# Patient Record
Sex: FEMALE | Race: WHITE | Employment: OTHER | URBAN - METROPOLITAN AREA
[De-identification: names, ages, dates, MRNs, and addresses within clinical notes are randomized per-mention and may not be internally consistent; named-entity substitution may affect disease eponyms.]

---

## 2015-08-12 LAB
CREATININE, EXTERNAL: 0.78
HBA1C MFR BLD HPLC: 5.5 %
LDL-C, EXTERNAL: 87

## 2017-01-26 ENCOUNTER — OFFICE VISIT (OUTPATIENT)
Dept: FAMILY MEDICINE CLINIC | Age: 62
End: 2017-01-26

## 2017-01-26 VITALS
RESPIRATION RATE: 18 BRPM | OXYGEN SATURATION: 95 % | BODY MASS INDEX: 32.58 KG/M2 | WEIGHT: 215 LBS | HEIGHT: 68 IN | DIASTOLIC BLOOD PRESSURE: 77 MMHG | SYSTOLIC BLOOD PRESSURE: 126 MMHG | HEART RATE: 61 BPM | TEMPERATURE: 97.9 F

## 2017-01-26 DIAGNOSIS — F41.9 ANXIETY: ICD-10-CM

## 2017-01-26 DIAGNOSIS — I10 ESSENTIAL HYPERTENSION: ICD-10-CM

## 2017-01-26 DIAGNOSIS — E03.9 HYPOTHYROIDISM, UNSPECIFIED TYPE: ICD-10-CM

## 2017-01-26 DIAGNOSIS — M54.5 CHRONIC LOW BACK PAIN, UNSPECIFIED BACK PAIN LATERALITY, WITH SCIATICA PRESENCE UNSPECIFIED: Primary | ICD-10-CM

## 2017-01-26 DIAGNOSIS — M62.838 MUSCLE SPASM: ICD-10-CM

## 2017-01-26 DIAGNOSIS — G62.9 NEUROPATHY: ICD-10-CM

## 2017-01-26 DIAGNOSIS — G89.29 CHRONIC LOW BACK PAIN, UNSPECIFIED BACK PAIN LATERALITY, WITH SCIATICA PRESENCE UNSPECIFIED: Primary | ICD-10-CM

## 2017-01-26 PROBLEM — M54.50 CHRONIC LOW BACK PAIN: Status: ACTIVE | Noted: 2017-01-26

## 2017-01-26 RX ORDER — CYCLOBENZAPRINE HCL 10 MG
TABLET ORAL
COMMUNITY
End: 2018-01-31

## 2017-01-26 RX ORDER — OLMESARTAN MEDOXOMIL 20 MG/1
20 TABLET ORAL DAILY
COMMUNITY
End: 2017-02-28 | Stop reason: SDUPTHER

## 2017-01-26 RX ORDER — GABAPENTIN 100 MG/1
200 CAPSULE ORAL 3 TIMES DAILY
Qty: 540 CAP | Refills: 1 | Status: SHIPPED | OUTPATIENT
Start: 2017-01-26 | End: 2017-06-05 | Stop reason: SDUPTHER

## 2017-01-26 RX ORDER — METHOCARBAMOL 750 MG/1
750 TABLET, FILM COATED ORAL 3 TIMES DAILY
Qty: 270 TAB | Refills: 1 | Status: SHIPPED | OUTPATIENT
Start: 2017-01-26 | End: 2017-06-05 | Stop reason: SDUPTHER

## 2017-01-26 RX ORDER — GLUCOSAMINE SULFATE 1500 MG
POWDER IN PACKET (EA) ORAL DAILY
COMMUNITY

## 2017-01-26 RX ORDER — DIAZEPAM 5 MG/1
5 TABLET ORAL
COMMUNITY
End: 2017-02-28 | Stop reason: SDUPTHER

## 2017-01-26 RX ORDER — VENLAFAXINE HYDROCHLORIDE 75 MG/1
75 CAPSULE, EXTENDED RELEASE ORAL DAILY
Qty: 90 CAP | Refills: 1 | Status: SHIPPED | OUTPATIENT
Start: 2017-01-26 | End: 2017-04-24 | Stop reason: SDUPTHER

## 2017-01-26 RX ORDER — LEVOTHYROXINE SODIUM 50 UG/1
TABLET ORAL
COMMUNITY

## 2017-01-26 RX ORDER — METHOCARBAMOL 750 MG/1
TABLET, FILM COATED ORAL 4 TIMES DAILY
COMMUNITY
End: 2017-01-26 | Stop reason: SDUPTHER

## 2017-01-26 RX ORDER — AZELASTINE HYDROCHLORIDE, FLUTICASONE PROPIONATE 137; 50 UG/1; UG/1
SPRAY, METERED NASAL
COMMUNITY
End: 2018-03-01

## 2017-01-26 RX ORDER — GLUCOSAM/CHONDRO/HERB 149/HYAL 750-100 MG
TABLET ORAL
COMMUNITY

## 2017-01-26 RX ORDER — GABAPENTIN 100 MG/1
200 CAPSULE ORAL 3 TIMES DAILY
COMMUNITY
End: 2017-01-26 | Stop reason: SDUPTHER

## 2017-01-26 RX ORDER — VENLAFAXINE HYDROCHLORIDE 75 MG/1
CAPSULE, EXTENDED RELEASE ORAL DAILY
COMMUNITY
End: 2017-01-26 | Stop reason: SDUPTHER

## 2017-01-26 NOTE — MR AVS SNAPSHOT
Visit Information Date & Time Provider Department Dept. Phone Encounter #  
 1/26/2017  2:15 PM Jenaro Bird. [address] Am[address] Bernardino Aleman [phone] [id number] Follow-up Instructions Return in about 3 weeks (around 2/16/2017), or if symptoms worsen or fail to improve, for chronic pain, anxiety, . Upcoming Health Maintenance Date Due Hepatitis C Screening 1955 PAP AKA CERVICAL CYTOLOGY 11/6/1977 BREAST CANCER SCRN MAMMOGRAM 11/6/2006 FOBT Q 1 YEAR AGE 50-75 11/6/2006 ZOSTER VACCINE AGE 60> 11/6/2016 DTaP/Tdap/Td series (2 - Td) 12/14/2020 Allergies as of 1/26/2017  Review Complete On: 1/26/2017 By: Maria Fernanda Sandoval LPN Severity Noted Reaction Type Reactions Sulfa (Sulfonamide Antibiotics)  01/26/2017    Rash Current Immunizations  Never Reviewed No immunizations on file. Not reviewed this visit You Were Diagnosed With   
  
 Codes Comments Chronic low back pain, unspecified back pain laterality, with sciatica presence unspecified    -  Primary ICD-10-CM: M54.5, G89.29 ICD-9-CM: 724.2, 338.29 Anxiety     ICD-10-CM: F41.9 ICD-9-CM: 300.00 Muscle spasm     ICD-10-CM: T48.646 ICD-9-CM: 728.85 Essential hypertension     ICD-10-CM: I10 
ICD-9-CM: 401.9 Neuropathy     ICD-10-CM: G62.9 ICD-9-CM: 355.9 Hypothyroidism, unspecified type     ICD-10-CM: E03.9 ICD-9-CM: 557. 9 Vitals BP Pulse Temp Resp Height(growth percentile) Weight(growth percentile) 126/77 (BP 1 Location: Left arm, BP Patient Position: Sitting) 61 97.9 °F (36.6 °C) (Oral) 18 5' 8\" (1.727 m) 215 lb (97.5 kg) SpO2 BMI OB Status Smoking Status 95% 32.69 kg/m2 Hysterectomy Never Smoker BMI and BSA Data Body Mass Index Body Surface Area  
 32.69 kg/m 2 2.16 m 2 Preferred Pharmacy Pharmacy Name Phone [address] [place], [place], . [address] [phone] Your Updated Medication List  
  
   
This list is accurate as of: 1/26/17  3:14 PM.  Always use your most recent med list.  
  
  
  
  
 BENICAR 20 mg tablet Generic drug:  olmesartan Take 20 mg by mouth daily. cyclobenzaprine 10 mg tablet Commonly known as:  FLEXERIL Take  by mouth three (3) times daily as needed for Muscle Spasm(s). diazePAM 5 mg tablet Commonly known as:  VALIUM Take 5 mg by mouth every six (6) hours as needed for Anxiety. DYMISTA 137-50 mcg/spray [place] Generic drug:  azelastine-fluticasone  
by Nasal route. EVAMIST 1.53 mg/spray (1.7%) [place] Generic drug:  Estradiol  
by TransDERmal route.  
  
 gabapentin 100 mg capsule Commonly known as:  NEURONTIN Take 2 Caps by mouth three (3) times daily. HYDROcodone-acetaminophen 7.5-500 mg per tablet Commonly known as:  Anjali Lang Take  by mouth every four (4) hours as needed for Pain.  
  
 levothyroxine 50 mcg tablet Commonly known as:  SYNTHROID Take  by mouth Daily (before breakfast). methocarbamol 750 mg tablet Commonly known as:  ROBAXIN Take 1 Tab by mouth three (3) times daily. Omega-3-DHA-EPA-Fish Oil 1,000 mg (120 mg-180 mg) Cap Take  by mouth. venlafaxine-SR 75 mg capsule Commonly known as:  EFFEXOR XR Take 1 Cap by mouth daily. VITAMIN D3 1,000 unit Cap Generic drug:  cholecalciferol Take  by mouth daily. WOMEN'S DAILY MULTIVITAMIN PO Take  by mouth. Prescriptions Sent to Pharmacy Refills  
 gabapentin (NEURONTIN) 100 mg capsule 1 Sig: Take 2 Caps by mouth three (3) times daily. Class: Normal  
 Pharmacy: [place], [place] - [address] Ph #: [phone] Route: Oral  
 venlafaxine-SR (EFFEXOR XR) 75 mg capsule 1 Sig: Take 1 Cap by mouth daily. Class: Normal  
 Pharmacy: [place], [place] - [address] Ph #: [phone]  Route: Oral  
 methocarbamol (ROBAXIN) 750 mg tablet 1 Sig: Take 1 Tab by mouth three (3) times daily. Class: Normal  
 Pharmacy: [place], [place] - [address] Ph #: [phone] Route: Oral  
  
We Performed the Following REFERRAL TO PAIN CLINIC [VTU93 Custom] Comments:  
 Please evaluate patient for chronic back pain. Follow-up Instructions Return in about 3 weeks (around 2/16/2017), or if symptoms worsen or fail to improve, for chronic pain, anxiety, . Referral Information Referral ID Referred By Referred To  
  
 [id number] Status Start Date End Date 1 New Request 1/26/17 1/26/18 If your referral has a status of pending review or denied, additional information will be sent to support the outcome of this decision. Patient Instructions Anxiety Disorder: Care Instructions Your Care Instructions Anxiety is a normal reaction to stress. Difficult situations can cause you to have symptoms such as sweaty palms and a nervous feeling. In an anxiety disorder, the symptoms are far more severe. Constant worry, muscle tension, trouble sleeping, nausea and diarrhea, and other symptoms can make normal daily activities difficult or impossible. These symptoms may occur for no reason, and they can affect your work, school, or social life. Medicines, counseling, and self-care can all help. Follow-up care is a key part of your treatment and safety. Be sure to make and go to all appointments, and call your doctor if you are having problems. It's also a good idea to know your test results and keep a list of the medicines you take. How can you care for yourself at home? · Take medicines exactly as directed. Call your doctor if you think you are having a problem with your medicine. · Go to your counseling sessions and follow-up appointments. · Recognize and accept your anxiety.  Then, when you are in a situation that makes you anxious, say to yourself, \"This is not an emergency. I feel uncomfortable, but I am not in danger. I can keep going even if I feel anxious. \" · Be kind to your body: ¨ Relieve tension with exercise or a massage. ¨ Get enough rest. 
¨ Avoid alcohol, caffeine, nicotine, and illegal drugs. They can increase your anxiety level and cause sleep problems. ¨ Learn and do relaxation techniques. See below for more about these techniques. · Engage your mind. Get out and do something you enjoy. Go to a [employer] movie, or take a walk or hike. Plan your day. Having too much or too little to do can make you anxious. · Keep a record of your symptoms. Discuss your fears with a good friend or family member, or join a support group for people with similar problems. Talking to others sometimes relieves stress. · Get involved in social groups, or volunteer to help others. Being alone sometimes makes things seem worse than they are. · Get at least 30 minutes of exercise on most days of the week to relieve stress. Walking is a good choice. You also may want to do other activities, such as running, swimming, cycling, or playing tennis or team sports. Relaxation techniques Do relaxation exercises 10 to 20 minutes a day. You can play soothing, relaxing music while you do them, if you wish. · Tell others in your house that you are going to do your relaxation exercises. Ask them not to disturb you. · Find a comfortable place, away from all distractions and noise. · Lie down on your back, or sit with your back straight. · Focus on your breathing. Make it slow and steady. · Breathe in through your nose. Breathe out through either your nose or mouth. · Breathe deeply, filling up the area between your navel and your rib cage. Breathe so that your belly goes up and down. · Do not hold your breath. · Breathe like this for 5 to 10 minutes. Notice the feeling of calmness throughout your whole body. As you continue to breathe slowly and deeply, relax by doing the following for another 5 to 10 minutes: · Tighten and relax each muscle group in your body. You can begin at your toes and work your way up to your head. · Imagine your muscle groups relaxing and becoming heavy. · Empty your mind of all thoughts. · Let yourself relax more and more deeply. · Become aware of the state of calmness that surrounds you. · When your relaxation time is over, you can bring yourself back to alertness by moving your fingers and toes and then your hands and feet and then stretching and moving your entire body. Sometimes people fall asleep during relaxation, but they usually wake up shortly afterward. · Always give yourself time to return to full alertness before you drive a car or do anything that might cause an accident if you are not fully alert. Never play a relaxation tape while you drive a car. When should you call for help? Call 911 anytime you think you may need emergency care. For example, call if: 
· You feel you cannot stop from hurting yourself or someone else. Keep the numbers for these national suicide hotlines: 0-634-445-TALK (8-483.842.7456) and 9-889-YIOFRTC (5-449.746.2346). If you or someone you know talks about suicide or feeling hopeless, get help right away. Watch closely for changes in your health, and be sure to contact your doctor if: 
· You have anxiety or fear that affects your life. · You have symptoms of anxiety that are new or different from those you had before. Where can you learn more? Go to http://dustin-lilia.info/. Enter P754 in the search box to learn more about \"Anxiety Disorder: Care Instructions. \" Current as of: July 26, 2016 Content Version: 11.1 © 1440-4032 [employer].  Care instructions adapted under license by [employer] (which disclaims liability or warranty for this information). If you have questions about a medical condition or this instruction, always ask your healthcare professional. [address] any warranty or liability for your use of this information. Back Pain: Care Instructions Your Care Instructions Back pain has many possible causes. It is often related to problems with muscles and ligaments of the back. It may also be related to problems with the nerves, discs, or bones of the back. Moving, lifting, standing, sitting, or sleeping in an awkward way can strain the back. Sometimes you don't notice the injury until later. Arthritis is another common cause of back pain. Although it may hurt a lot, back pain usually improves on its own within several weeks. Most people recover in 12 weeks or less. Using good home treatment and being careful not to stress your back can help you feel better sooner. Follow-up care is a key part of your treatment and safety. Be sure to make and go to all appointments, and call your doctor if you are having problems. Its also a good idea to know your test results and keep a list of the medicines you take. How can you care for yourself at home? · Sit or lie in positions that are most comfortable and reduce your pain. Try one of these positions when you lie down: ¨ Lie on your back with your knees bent and supported by large pillows. ¨ Lie on the floor with your legs on the seat of a sofa or chair. Sepideh Running on your side with your knees and hips bent and a pillow between your legs. ¨ Lie on your stomach if it does not make pain worse. · Do not sit up in bed, and avoid soft couches and twisted positions. Bed rest can help relieve pain at first, but it delays healing. Avoid bed rest after the first day of back pain. · Change positions every 30 minutes. If you must sit for long periods of time, take breaks from sitting. Get up and walk around, or lie in a comfortable position. · Try using a heating pad on a low or medium setting for 15 to 20 minutes every 2 or 3 hours. Try a warm shower in place of one session with the heating pad. · You can also try an ice pack for 10 to 15 minutes every 2 to 3 hours. Put a thin cloth between the ice pack and your skin. · Take pain medicines exactly as directed. ¨ If the doctor gave you a prescription medicine for pain, take it as prescribed. ¨ If you are not taking a prescription pain medicine, ask your doctor if you can take an over-the-counter medicine. · Take short walks several times a day. You can start with 5 to 10 minutes, 3 or 4 times a day, and work up to longer walks. Walk on level surfaces and avoid hills and stairs until your back is better. · Return to work and other activities as soon as you can. Continued rest without activity is usually not good for your back. · To prevent future back pain, do exercises to stretch and strengthen your back and stomach. Learn how to use good posture, safe lifting techniques, and proper body mechanics. When should you call for help? Call your doctor now or seek immediate medical care if: 
· You have new or worsening numbness in your legs. · You have new or worsening weakness in your legs. (This could make it hard to stand up.) · You lose control of your bladder or bowels. Watch closely for changes in your health, and be sure to contact your doctor if: 
· Your pain gets worse. · You are not getting better after 2 weeks. Where can you learn more? Go to http://dustin-lilia.info/. Enter T596 in the search box to learn more about \"Back Pain: Care Instructions. \" Current as of: May 23, 2016 Content Version: 11.1 © 8931-6060 [employer]. Care instructions adapted under license by [employer] (which disclaims liability or warranty for this information).  If you have questions about a medical condition or this instruction, always ask your healthcare professional. [address]  any warranty or liability for your use of this information. Introducing [facility]! [facility] introduces Chlorine Genie patient portal. Now you can access parts of your medical record, email your doctor's office, and request medication refills online. 1. In your internet browser, go to [email or web address] 2. Click on the First Time User? Click Here link in the Sign In box. You will see the New Member Sign Up page. 3. Enter your Chlorine Genie Access Code exactly as it appears below. You will not need to use this code after youve completed the sign-up process. If you do not sign up before the expiration date, you must request a new code. · Chlorine Genie Access Code: [email or web address] Expires: 4/26/2017  1:35 PM 
 
4. Enter the last four digits of your Social Security Number (xxxx) and Date of Birth (mm/dd/yyyy) as indicated and click Submit. You will be taken to the next sign-up page. 5. Create a Chlorine Genie ID. This will be your Chlorine Genie login ID and cannot be changed, so think of one that is secure and easy to remember. 6. Create a Chlorine Genie password. You can change your password at any time. 7. Enter your Password Reset Question and Answer. This can be used at a later time if you forget your password. 8. Enter your e-mail address. You will receive e-mail notification when new information is available in [address]. 9. Click Sign Up. You can now view and download portions of your medical record. 10. Click the Download Summary menu link to download a portable copy of your medical information. If you have questions, please visit the Frequently Asked Questions section of the Chlorine Genie website. Remember, Chlorine Genie is NOT to be used for urgent needs. For medical emergencies, dial 911. Now available from your iPhone and Android! Please provide this summary of care documentation to your next provider. Your primary care clinician is listed as Berta Rivera. If you have any questions after today's visit, please call [phone].

## 2017-01-26 NOTE — PROGRESS NOTES
1. Have you been to the ER, urgent care clinic since your last visit? Hospitalized since your last visit? No    2. Have you seen or consulted any other health care providers outside of the [facility] since your last visit? Include any pap smears or colon screening. No     HPI  Verona Murillo comes in to establish care. 1) Anxiety and depression: patient has been managed for anxiety and depression, now out of medication for weeks. She is having shaking of her hands. Requests refill of medication. I will send in script for effexor. 2) Chronic pain: she has chronic back pain. She takes hydrocodone for this. She has been f/u in pain clinic for pain management. She is not interested in surgical management. Also does not want spine injections. I will place referral to pain management. I will also request records. 3) Muscle spasm: she has chronic muscle spasms. Takes robaxin. Requests refill of the medication. I will send in script for this. 4) Neuropathy: patient is on gabapentin. She is out of the medication. She has generalized neuropathic pain. I will send in script for the medication. 5) HTN: On olmesartan, stable. 6) Hypothyroidism: on levothyroxine. Past Medical History  Past Medical History   Diagnosis Date    Headache     Hypertension     Scoliosis     Thyroid disease     Vertigo        Surgical History  Past Surgical History   Procedure Laterality Date    Hx colonoscopy  08/2014    Hx hysterectomy  03/17/2003    Hx breast augmentation  11/28/2006    Hx gastric bypass  10/25/2001    Hx cholecystectomy          Medications  Current Outpatient Prescriptions   Medication Sig Dispense Refill    olmesartan (BENICAR) 20 mg tablet Take 20 mg by mouth daily.  levothyroxine (SYNTHROID) 50 mcg tablet Take  by mouth Daily (before breakfast).  cholecalciferol (VITAMIN D3) 1,000 unit cap Take  by mouth daily.       Omega-3-DHA-EPA-Fish Oil 1,000 mg (120 mg-180 mg) cap Take  by mouth.      MULTIVIT WITH CALCIUM,IRON,MIN (WOMEN'S DAILY MULTIVITAMIN PO) Take  by mouth.  Estradiol (EVAMIST) 1.53 mg/spray (1.7%) spry by TransDERmal route.  HYDROcodone-acetaminophen (LORTAB) 7.5-500 mg per tablet Take  by mouth every four (4) hours as needed for Pain.  diazePAM (VALIUM) 5 mg tablet Take 5 mg by mouth every six (6) hours as needed for Anxiety.  azelastine-fluticasone (DYMISTA) 137-50 mcg/spray spry by Nasal route.  cyclobenzaprine (FLEXERIL) 10 mg tablet Take  by mouth three (3) times daily as needed for Muscle Spasm(s).  gabapentin (NEURONTIN) 100 mg capsule Take 2 Caps by mouth three (3) times daily. 540 Cap 1    venlafaxine-SR (EFFEXOR XR) 75 mg capsule Take 1 Cap by mouth daily. 90 Cap 1    methocarbamol (ROBAXIN) 750 mg tablet Take 1 Tab by mouth three (3) times daily. 270 Tab 1       Allergies  Allergies   Allergen Reactions    Sulfa (Sulfonamide Antibiotics) Rash       Family History  Family History   Problem Relation Age of Onset    Elevated Lipids Mother     Hypertension Brother        Social History  Social History     Social History    Marital status:      Spouse name: N/A    Number of children: N/A    Years of education: N/A     Occupational History    Not on file.      Social History Main Topics    Smoking status: Never Smoker    Smokeless tobacco: Never Used    Alcohol use 1.8 oz/week     3 Glasses of wine per week    Drug use: No    Sexual activity: Yes     Partners: Male     Birth control/ protection: None     Other Topics Concern    Not on file     Social History Narrative    No narrative on file       Review of Systems  Review of Systems - History obtained from chart review and the patient  General ROS: positive for  - fatigue and malaise  negative for - chills or fever  Psychological ROS: positive for - anxiety, behavioral disorder, depression and mood swings  Ophthalmic ROS: negative  ENT ROS: negative  Endocrine ROS: hypothyroidism  Respiratory ROS: no cough, shortness of breath, or wheezing  Cardiovascular ROS: no chest pain or dyspnea on exertion  Gastrointestinal ROS: no abdominal pain, change in bowel habits, or black or bloody stools  Genito-Urinary ROS: no dysuria, trouble voiding, or hematuria  Musculoskeletal ROS: positive for - muscle pain and pain in back - lower and neck - both sides  Neurological ROS: positive for - numbness/tingling    Vital Signs  Visit Vitals    /77 (BP 1 Location: Left arm, BP Patient Position: Sitting)    Pulse 61    Temp 97.9 °F (36.6 °C) (Oral)    Resp 18    Ht 5' 8\" (1.727 m)    Wt 215 lb (97.5 kg)    SpO2 95%    BMI 32.69 kg/m2         Physical Exam  Physical Examination: General appearance - oriented to person, place, and time, acyanotic, in no respiratory distress and well hydrated  Mental status - alert, oriented to person, place, and time  Mouth - mucous membranes moist, pharynx normal without lesions  Chest - clear to auscultation, no wheezes, rales or rhonchi, symmetric air entry, no tachypnea, retractions or cyanosis  Heart - normal rate, regular rhythm, normal S1, S2, no murmurs, rubs, clicks or gallops  Back exam - limited range of motion, pain with motion noted during exam  Neurological - alert, oriented, normal speech, no focal findings or movement disorder noted  Musculoskeletal - osteoarthritic changes noted in both hands  Extremities - intact peripheral pulses    Diagnostics  Orders Placed This Encounter    REFERRAL TO PAIN CLINIC     Referral Priority:   Routine     Referral Type:   Consultation     Referral Reason:   Specialty Services Required    olmesartan (BENICAR) 20 mg tablet     Sig: Take 20 mg by mouth daily.  levothyroxine (SYNTHROID) 50 mcg tablet     Sig: Take  by mouth Daily (before breakfast).  cholecalciferol (VITAMIN D3) 1,000 unit cap     Sig: Take  by mouth daily.     DISCONTD: gabapentin (NEURONTIN) 100 mg capsule     Sig: Take 200 mg by mouth three (3) times daily.  Omega-3-DHA-EPA-Fish Oil 1,000 mg (120 mg-180 mg) cap     Sig: Take  by mouth.  MULTIVIT WITH CALCIUM,IRON,MIN (WOMEN'S DAILY MULTIVITAMIN PO)     Sig: Take  by mouth.  DISCONTD: venlafaxine-SR (EFFEXOR XR) 75 mg capsule     Sig: Take  by mouth daily.  Estradiol (EVAMIST) 1.53 mg/spray (1.7%) spry     Sig: by TransDERmal route.  HYDROcodone-acetaminophen (LORTAB) 7.5-500 mg per tablet     Sig: Take  by mouth every four (4) hours as needed for Pain.  diazePAM (VALIUM) 5 mg tablet     Sig: Take 5 mg by mouth every six (6) hours as needed for Anxiety.  DISCONTD: methocarbamol (ROBAXIN) 750 mg tablet     Sig: Take  by mouth four (4) times daily.  azelastine-fluticasone (DYMISTA) 137-50 mcg/spray spry     Sig: by Nasal route.  cyclobenzaprine (FLEXERIL) 10 mg tablet     Sig: Take  by mouth three (3) times daily as needed for Muscle Spasm(s).  gabapentin (NEURONTIN) 100 mg capsule     Sig: Take 2 Caps by mouth three (3) times daily. Dispense:  540 Cap     Refill:  1    venlafaxine-SR (EFFEXOR XR) 75 mg capsule     Sig: Take 1 Cap by mouth daily. Dispense:  90 Cap     Refill:  1    methocarbamol (ROBAXIN) 750 mg tablet     Sig: Take 1 Tab by mouth three (3) times daily. Dispense:  270 Tab     Refill:  1         Results  No results found for this or any previous visit. ASSESSMENT and PLAN    ICD-10-CM ICD-9-CM    1. Chronic low back pain, unspecified back pain laterality, with sciatica presence unspecified M54.5 724.2 REFERRAL TO PAIN CLINIC    G89.29 338.29    2. Anxiety F41.9 300.00    3. Muscle spasm M62.838 728.85    4. Essential hypertension I10 401.9    5. Neuropathy G62.9 355.9    6.  Hypothyroidism, unspecified type E03.9 244.9      current treatment plan is effective, no change in therapy  reviewed diet, exercise and weight control  reviewed medications and side effects in detail    I have discussed the diagnosis with the patient and the intended plan of care as seen in the above orders. The patient has received an after-visit summary and questions were answered concerning future plans. I have discussed medication, side effects, and warnings with the patient in detail. The patient verbalized understanding and is in agreement with the plan of care. The patient will contact the office with any additional concerns.     Virgie Tiwari MD

## 2017-01-26 NOTE — PATIENT INSTRUCTIONS
Anxiety Disorder: Care Instructions  Your Care Instructions  Anxiety is a normal reaction to stress. Difficult situations can cause you to have symptoms such as sweaty palms and a nervous feeling. In an anxiety disorder, the symptoms are far more severe. Constant worry, muscle tension, trouble sleeping, nausea and diarrhea, and other symptoms can make normal daily activities difficult or impossible. These symptoms may occur for no reason, and they can affect your work, school, or social life. Medicines, counseling, and self-care can all help. Follow-up care is a key part of your treatment and safety. Be sure to make and go to all appointments, and call your doctor if you are having problems. It's also a good idea to know your test results and keep a list of the medicines you take. How can you care for yourself at home? · Take medicines exactly as directed. Call your doctor if you think you are having a problem with your medicine. · Go to your counseling sessions and follow-up appointments. · Recognize and accept your anxiety. Then, when you are in a situation that makes you anxious, say to yourself, \"This is not an emergency. I feel uncomfortable, but I am not in danger. I can keep going even if I feel anxious. \"  · Be kind to your body:  ¨ Relieve tension with exercise or a massage. ¨ Get enough rest.  ¨ Avoid alcohol, caffeine, nicotine, and illegal drugs. They can increase your anxiety level and cause sleep problems. ¨ Learn and do relaxation techniques. See below for more about these techniques. · Engage your mind. Get out and do something you enjoy. Go to a funny movie, or take a walk or hike. Plan your day. Having too much or too little to do can make you anxious. · Keep a record of your symptoms. Discuss your fears with a good friend or family member, or join a support group for people with similar problems. Talking to others sometimes relieves stress.   · Get involved in social groups, or volunteer to help others. Being alone sometimes makes things seem worse than they are. · Get at least 30 minutes of exercise on most days of the week to relieve stress. Walking is a good choice. You also may want to do other activities, such as running, swimming, cycling, or playing tennis or team sports. Relaxation techniques  Do relaxation exercises 10 to 20 minutes a day. You can play soothing, relaxing music while you do them, if you wish. · Tell others in your house that you are going to do your relaxation exercises. Ask them not to disturb you. · Find a comfortable place, away from all distractions and noise. · Lie down on your back, or sit with your back straight. · Focus on your breathing. Make it slow and steady. · Breathe in through your nose. Breathe out through either your nose or mouth. · Breathe deeply, filling up the area between your navel and your rib cage. Breathe so that your belly goes up and down. · Do not hold your breath. · Breathe like this for 5 to 10 minutes. Notice the feeling of calmness throughout your whole body. As you continue to breathe slowly and deeply, relax by doing the following for another 5 to 10 minutes:  · Tighten and relax each muscle group in your body. You can begin at your toes and work your way up to your head. · Imagine your muscle groups relaxing and becoming heavy. · Empty your mind of all thoughts. · Let yourself relax more and more deeply. · Become aware of the state of calmness that surrounds you. · When your relaxation time is over, you can bring yourself back to alertness by moving your fingers and toes and then your hands and feet and then stretching and moving your entire body. Sometimes people fall asleep during relaxation, but they usually wake up shortly afterward. · Always give yourself time to return to full alertness before you drive a car or do anything that might cause an accident if you are not fully alert.  Never play a relaxation tape while you drive a car. When should you call for help? Call 911 anytime you think you may need emergency care. For example, call if:  · You feel you cannot stop from hurting yourself or someone else. Keep the numbers for these national suicide hotlines: 1-658-183-TALK (4-943.296.8021) and 6-700-UOJAGBB (0-970.516.6740). If you or someone you know talks about suicide or feeling hopeless, get help right away. Watch closely for changes in your health, and be sure to contact your doctor if:  · You have anxiety or fear that affects your life. · You have symptoms of anxiety that are new or different from those you had before. Where can you learn more? Go to http://[employer]Sante[employer] Productslilia.info/. Enter P754 in the search box to learn more about \"Anxiety Disorder: Care Instructions. \"  Current as of: July 26, 2016  Content Version: 11.1  © 7912-3350 [employer]. Care instructions adapted under license by [employer] (which disclaims liability or warranty for this information). If you have questions about a medical condition or this instruction, always ask your healthcare professional. [place] [address] any warranty or liability for your use of this information. Back Pain: Care Instructions  Your Care Instructions    Back pain has many possible causes. It is often related to problems with muscles and ligaments of the back. It may also be related to problems with the nerves, discs, or bones of the back. Moving, lifting, standing, sitting, or sleeping in an awkward way can strain the back. Sometimes you don't notice the injury until later. Arthritis is another common cause of back pain. Although it may hurt a lot, back pain usually improves on its own within several weeks. Most people recover in 12 weeks or less. Using good home treatment and being careful not to stress your back can help you feel better sooner.   Follow-up care is a key part of your treatment and safety. Be sure to make and go to all appointments, and call your doctor if you are having problems. Its also a good idea to know your test results and keep a list of the medicines you take. How can you care for yourself at home? · Sit or lie in positions that are most comfortable and reduce your pain. Try one of these positions when you lie down:  ¨ Lie on your back with your knees bent and supported by large pillows. ¨ Lie on the floor with your legs on the seat of a sofa or chair. Dallie Rhona on your side with your knees and hips bent and a pillow between your legs. ¨ Lie on your stomach if it does not make pain worse. · Do not sit up in bed, and avoid soft couches and twisted positions. Bed rest can help relieve pain at first, but it delays healing. Avoid bed rest after the first day of back pain. · Change positions every 30 minutes. If you must sit for long periods of time, take breaks from sitting. Get up and walk around, or lie in a comfortable position. · Try using a heating pad on a low or medium setting for 15 to 20 minutes every 2 or 3 hours. Try a warm shower in place of one session with the heating pad. · You can also try an ice pack for 10 to 15 minutes every 2 to 3 hours. Put a thin cloth between the ice pack and your skin. · Take pain medicines exactly as directed. ¨ If the doctor gave you a prescription medicine for pain, take it as prescribed. ¨ If you are not taking a prescription pain medicine, ask your doctor if you can take an over-the-counter medicine. · Take short walks several times a day. You can start with 5 to 10 minutes, 3 or 4 times a day, and work up to longer walks. Walk on level surfaces and avoid hills and stairs until your back is better. · Return to work and other activities as soon as you can. Continued rest without activity is usually not good for your back. · To prevent future back pain, do exercises to stretch and strengthen your back and stomach.  Learn how to use good posture, safe lifting techniques, and proper body mechanics. When should you call for help? Call your doctor now or seek immediate medical care if:  · You have new or worsening numbness in your legs. · You have new or worsening weakness in your legs. (This could make it hard to stand up.)  · You lose control of your bladder or bowels. Watch closely for changes in your health, and be sure to contact your doctor if:  · Your pain gets worse. · You are not getting better after 2 weeks. Where can you learn more? Go to http://dustin-lilia.info/. Enter I853 in the search box to learn more about \"Back Pain: Care Instructions. \"  Current as of: May 23, 2016  Content Version: 11.1  © 9053-5981 [employer]. Care instructions adapted under license by [employer] (which disclaims liability or warranty for this information). If you have questions about a medical condition or this instruction, always ask your healthcare professional. [place] [address] any warranty or liability for your use of this information.

## 2017-02-21 ENCOUNTER — HOSPITAL ENCOUNTER (OUTPATIENT)
Dept: LAB | Age: 62
Discharge: HOME | End: 2017-02-21
Payer: COMMERCIAL

## 2017-02-21 ENCOUNTER — OFFICE VISIT (OUTPATIENT)
Dept: FAMILY MEDICINE CLINIC | Age: 62
End: 2017-02-21

## 2017-02-21 VITALS
HEART RATE: 71 BPM | DIASTOLIC BLOOD PRESSURE: 86 MMHG | BODY MASS INDEX: 31.98 KG/M2 | WEIGHT: 211 LBS | HEIGHT: 68 IN | RESPIRATION RATE: 18 BRPM | OXYGEN SATURATION: 98 % | TEMPERATURE: 97.8 F | SYSTOLIC BLOOD PRESSURE: 134 MMHG

## 2017-02-21 DIAGNOSIS — F41.9 ANXIETY: ICD-10-CM

## 2017-02-21 DIAGNOSIS — R42 DIZZINESS: ICD-10-CM

## 2017-02-21 DIAGNOSIS — E03.9 HYPOTHYROIDISM, UNSPECIFIED TYPE: ICD-10-CM

## 2017-02-21 DIAGNOSIS — H54.7 DIMINISHED VISION: ICD-10-CM

## 2017-02-21 DIAGNOSIS — G43.109 MIGRAINE WITH VERTIGO: Primary | ICD-10-CM

## 2017-02-21 DIAGNOSIS — G62.9 NEUROPATHY: ICD-10-CM

## 2017-02-21 DIAGNOSIS — Z12.11 SCREEN FOR COLON CANCER: ICD-10-CM

## 2017-02-21 DIAGNOSIS — Z11.59 NEED FOR HEPATITIS C SCREENING TEST: ICD-10-CM

## 2017-02-21 DIAGNOSIS — Z23 ENCOUNTER FOR IMMUNIZATION: ICD-10-CM

## 2017-02-21 DIAGNOSIS — G43.109 MIGRAINE WITH VERTIGO: ICD-10-CM

## 2017-02-21 LAB
ALBUMIN SERPL BCP-MCNC: 4.1 G/DL (ref 3.4–5)
ALBUMIN/GLOB SERPL: 1.3 {RATIO} (ref 0.8–1.7)
ALP SERPL-CCNC: 116 U/L (ref 45–117)
ALT SERPL-CCNC: 83 U/L (ref 13–56)
AMPHET UR QL SCN: NEGATIVE
ANION GAP BLD CALC-SCNC: 8 MMOL/L (ref 3–18)
APPEARANCE UR: CLEAR
AST SERPL W P-5'-P-CCNC: 80 U/L (ref 15–37)
BACTERIA URNS QL MICRO: ABNORMAL /HPF
BARBITURATES UR QL SCN: NEGATIVE
BASOPHILS # BLD AUTO: 0 K/UL (ref 0–0.06)
BASOPHILS # BLD: 1 % (ref 0–2)
BENZODIAZ UR QL: POSITIVE
BILIRUB SERPL-MCNC: 0.5 MG/DL (ref 0.2–1)
BILIRUB UR QL: NEGATIVE
BUN SERPL-MCNC: 11 MG/DL (ref 7–18)
BUN/CREAT SERPL: 16 (ref 12–20)
CALCIUM SERPL-MCNC: 9.5 MG/DL (ref 8.5–10.1)
CANNABINOIDS UR QL SCN: NEGATIVE
CHLORIDE SERPL-SCNC: 102 MMOL/L (ref 100–108)
CHOLEST SERPL-MCNC: 239 MG/DL
CO2 SERPL-SCNC: 30 MMOL/L (ref 21–32)
COCAINE UR QL SCN: NEGATIVE
COLOR UR: ABNORMAL
CREAT SERPL-MCNC: 0.69 MG/DL (ref 0.6–1.3)
DIFFERENTIAL METHOD BLD: ABNORMAL
EOSINOPHIL # BLD: 0.1 K/UL (ref 0–0.4)
EOSINOPHIL NFR BLD: 2 % (ref 0–5)
EPITH CASTS URNS QL MICRO: ABNORMAL /LPF (ref 0–5)
ERYTHROCYTE [DISTWIDTH] IN BLOOD BY AUTOMATED COUNT: 12.5 % (ref 11.6–14.5)
GLOBULIN SER CALC-MCNC: 3.2 G/DL (ref 2–4)
GLUCOSE SERPL-MCNC: 95 MG/DL (ref 74–99)
GLUCOSE UR STRIP.AUTO-MCNC: NEGATIVE MG/DL
HCT VFR BLD AUTO: 45.2 % (ref 35–45)
HDLC SERPL-MCNC: 86 MG/DL (ref 40–60)
HDLC SERPL: 2.8 {RATIO} (ref 0–5)
HDSCOM,HDSCOM: ABNORMAL
HGB BLD-MCNC: 14.5 G/DL (ref 12–16)
HGB UR QL STRIP: NEGATIVE
KETONES UR QL STRIP.AUTO: NEGATIVE MG/DL
LDLC SERPL CALC-MCNC: 116.8 MG/DL (ref 0–100)
LEUKOCYTE ESTERASE UR QL STRIP.AUTO: ABNORMAL
LIPID PROFILE,FLP: ABNORMAL
LYMPHOCYTES # BLD AUTO: 38 % (ref 21–52)
LYMPHOCYTES # BLD: 2.1 K/UL (ref 0.9–3.6)
MCH RBC QN AUTO: 33 PG (ref 24–34)
MCHC RBC AUTO-ENTMCNC: 32.1 G/DL (ref 31–37)
MCV RBC AUTO: 103 FL (ref 74–97)
METHADONE UR QL: NEGATIVE
MONOCYTES # BLD: 0.5 K/UL (ref 0.05–1.2)
MONOCYTES NFR BLD AUTO: 10 % (ref 3–10)
NEUTS SEG # BLD: 2.7 K/UL (ref 1.8–8)
NEUTS SEG NFR BLD AUTO: 49 % (ref 40–73)
NITRITE UR QL STRIP.AUTO: NEGATIVE
OPIATES UR QL: NEGATIVE
PCP UR QL: NEGATIVE
PH UR STRIP: 6.5 [PH] (ref 5–8)
PLATELET # BLD AUTO: 186 K/UL (ref 135–420)
PMV BLD AUTO: 11.1 FL (ref 9.2–11.8)
POTASSIUM SERPL-SCNC: 4.1 MMOL/L (ref 3.5–5.5)
PROT SERPL-MCNC: 7.3 G/DL (ref 6.4–8.2)
PROT UR STRIP-MCNC: NEGATIVE MG/DL
RBC # BLD AUTO: 4.39 M/UL (ref 4.2–5.3)
RBC #/AREA URNS HPF: ABNORMAL /HPF (ref 0–5)
SODIUM SERPL-SCNC: 140 MMOL/L (ref 136–145)
SP GR UR REFRACTOMETRY: 1.02 (ref 1–1.03)
TRIGL SERPL-MCNC: 181 MG/DL (ref ?–150)
TSH SERPL DL<=0.05 MIU/L-ACNC: 2.06 UIU/ML (ref 0.36–3.74)
UROBILINOGEN UR QL STRIP.AUTO: 1 EU/DL (ref 0.2–1)
VLDLC SERPL CALC-MCNC: 36.2 MG/DL
WBC # BLD AUTO: 5.5 K/UL (ref 4.6–13.2)
WBC URNS QL MICRO: ABNORMAL /HPF (ref 0–4)

## 2017-02-21 PROCEDURE — 80061 LIPID PANEL: CPT | Performed by: FAMILY MEDICINE

## 2017-02-21 PROCEDURE — 86803 HEPATITIS C AB TEST: CPT | Performed by: FAMILY MEDICINE

## 2017-02-21 PROCEDURE — 84443 ASSAY THYROID STIM HORMONE: CPT | Performed by: FAMILY MEDICINE

## 2017-02-21 PROCEDURE — 85025 COMPLETE CBC W/AUTO DIFF WBC: CPT | Performed by: FAMILY MEDICINE

## 2017-02-21 PROCEDURE — 80053 COMPREHEN METABOLIC PANEL: CPT | Performed by: FAMILY MEDICINE

## 2017-02-21 PROCEDURE — 80307 DRUG TEST PRSMV CHEM ANLYZR: CPT | Performed by: FAMILY MEDICINE

## 2017-02-21 PROCEDURE — 81001 URINALYSIS AUTO W/SCOPE: CPT | Performed by: FAMILY MEDICINE

## 2017-02-21 RX ORDER — DIAZEPAM 5 MG/1
5 TABLET ORAL
Status: CANCELLED | OUTPATIENT
Start: 2017-02-21

## 2017-02-21 NOTE — PROGRESS NOTES
Chief Complaint   Patient presents with    Pain (Chronic)     follow-up    Headache     Patient in for chronic pain follow-up. She also c/o on and off headache and blurry vision for a week now. 1. Have you been to the ER, urgent care clinic since your last visit? Hospitalized since your last visit? No    2. Have you seen or consulted any other health care providers outside of the [address] [place] [address] since your last visit? Include any pap smears or colon screening. No     HPI  Ivan Zuniga comes in for f/u care. 1) Migraine: patient has migraine headaches. These have been long standing. She does have associated dizziness and vertigo. Lately has had more episodes of feeling like room is spinning. Takes meclizine but this does not help. Says she was seen by a neurologist in the past and only diazepam helped with the dizziness. For the migraine various medications have been tried without success. At the moment taking tylenol extra strength. This does help a little. She was encouraged to keep taking the tylenol ES for the headache. I will refer the patient to neurology. I will check labs today. 2) Neuropathy: has generalized numbness and tingling, on gabapentin. Will continue with medication and have her f/u with neurology. 3) Anxiety and depression: she is on wellbutrin. Stable on this though continues to have some episodes of breakthrough anxiety. Will check thyroid and other labs. 4) Thyroid disease: will recheck tsh today.        Past Medical History  Past Medical History   Diagnosis Date    Headache     Hypertension     Scoliosis     Thyroid disease     Vertigo        Surgical History  Past Surgical History   Procedure Laterality Date    Hx colonoscopy  08/2014    Hx hysterectomy  03/17/2003    Hx breast augmentation  11/28/2006    Hx gastric bypass  10/25/2001    Hx cholecystectomy          Medications  Current Outpatient Prescriptions   Medication Sig Dispense Refill    olmesartan (BENICAR) 20 mg tablet Take 20 mg by mouth daily.  levothyroxine (SYNTHROID) 50 mcg tablet Take  by mouth Daily (before breakfast).  cholecalciferol (VITAMIN D3) 1,000 unit cap Take  by mouth daily.  Omega-3-DHA-EPA-Fish Oil 1,000 mg (120 mg-180 mg) cap Take  by mouth.  MULTIVIT WITH CALCIUM,IRON,MIN (WOMEN'S DAILY MULTIVITAMIN PO) Take  by mouth.  Estradiol (EVAMIST) 1.53 mg/spray (1.7%) spry by TransDERmal route.  HYDROcodone-acetaminophen (LORTAB) 7.5-500 mg per tablet Take  by mouth every four (4) hours as needed for Pain.  diazePAM (VALIUM) 5 mg tablet Take 5 mg by mouth every six (6) hours as needed for Anxiety.  azelastine-fluticasone (DYMISTA) 137-50 mcg/spray spry by Nasal route.  cyclobenzaprine (FLEXERIL) 10 mg tablet Take  by mouth three (3) times daily as needed for Muscle Spasm(s).  gabapentin (NEURONTIN) 100 mg capsule Take 2 Caps by mouth three (3) times daily. 540 Cap 1    venlafaxine-SR (EFFEXOR XR) 75 mg capsule Take 1 Cap by mouth daily. 90 Cap 1    methocarbamol (ROBAXIN) 750 mg tablet Take 1 Tab by mouth three (3) times daily. 270 Tab 1       Allergies  Allergies   Allergen Reactions    Sulfa (Sulfonamide Antibiotics) Rash       Family History  Family History   Problem Relation Age of Onset    Elevated Lipids Mother     Hypertension Brother        Social History  Social History     Social History    Marital status:      Spouse name: N/A    Number of children: N/A    Years of education: N/A     Occupational History    Not on file.      Social History Main Topics    Smoking status: Never Smoker    Smokeless tobacco: Never Used    Alcohol use 1.8 oz/week     3 Glasses of wine per week    Drug use: No    Sexual activity: Yes     Partners: Male     Birth control/ protection: None     Other Topics Concern    Not on file     Social History Narrative       Review of Systems  Review of Systems - History obtained from chart review and the patient  General ROS: positive for  - fatigue, malaise and sleep disturbance  negative for - chills or fever  Psychological ROS: positive for - anxiety, concentration difficulties, depression and memory difficulties  Ophthalmic ROS: positive for - decreased vision  ENT ROS: positive for - headaches and sinus pain  Allergy and Immunology ROS: negative  Respiratory ROS: no cough, shortness of breath, or wheezing  Cardiovascular ROS: no chest pain or dyspnea on exertion  Gastrointestinal ROS: no abdominal pain, change in bowel habits, or black or bloody stools  Genito-Urinary ROS: no dysuria, trouble voiding, or hematuria  Musculoskeletal ROS: positive for - muscle pain  Neurological ROS: positive for - dizziness, headaches, numbness/tingling and tremors    Vital Signs  Visit Vitals    /86 (BP 1 Location: Left arm, BP Patient Position: Sitting)    Pulse 71    Temp 97.8 °F (36.6 °C) (Oral)    Resp 18    Ht 5' 8\" (1.727 m)    Wt 211 lb (95.7 kg)    SpO2 98%    BMI 32.08 kg/m2         Physical Exam  Physical Examination: General appearance - acyanotic, in no respiratory distress, well hydrated and chronically ill appearing  Mental status - alert, oriented to person, place, and time, anxious  Mouth - mucous membranes moist, pharynx normal without lesions  Neck - supple, no significant adenopathy  Lymphatics - no palpable lymphadenopathy  Chest - no tachypnea, retractions or cyanosis  Heart - normal rate and regular rhythm, S1 and S2 normal  Neurological - abnormal neurological exam unchanged from prior examinations  Extremities - intact peripheral pulses    Diagnostics  Orders Placed This Encounter    CBC WITH AUTOMATED DIFF     Standing Status:   Future     Standing Expiration Date:   2/22/2018    LIPID PANEL     Standing Status:   Future     Standing Expiration Date:   8/29/5561    METABOLIC PANEL, COMPREHENSIVE     Standing Status:   Future     Standing Expiration Date:   2/22/2018    TSH 3RD GENERATION     Standing Status:   Future     Standing Expiration Date:   2/22/2018    URINALYSIS W/ RFLX MICROSCOPIC     Standing Status:   Future     Standing Expiration Date:   2/22/2018    DRUG SCREEN, URINE ([facility] Only)     Standing Status:   Future     Standing Expiration Date:   2/22/2018    HEPATITIS C AB     Standing Status:   Future     Standing Expiration Date:   2/22/2018    OCCULT BLOOD, IMMUNOASSAY (FIT)     Standing Status:   Future     Standing Expiration Date:   2/22/2018    REFERRAL TO NEUROLOGY     Referral Priority:   Routine     Referral Type:   Consultation     Referral Reason:   Specialty Services Required         Results  Results for orders placed or performed in visit on 02/21/17   AMB EXT LDL-C   Result Value Ref Range    LDL-C, External 87    AMB EXT CREATININE   Result Value Ref Range    Creatinine, External 0.78    AMB EXT HGBA1C   Result Value Ref Range    Hemoglobin A1c, External 5.5      ASSESSMENT and PLAN    ICD-10-CM ICD-9-CM    1. Migraine with vertigo G43.109 346.00 CBC WITH AUTOMATED DIFF      METABOLIC PANEL, COMPREHENSIVE      REFERRAL TO NEUROLOGY      OCCULT BLOOD, IMMUNOASSAY (FIT)      KS COLLECTION VENOUS BLOOD,VENIPUNCTURE   2. Dizziness R42 780.4 CBC WITH AUTOMATED DIFF      METABOLIC PANEL, COMPREHENSIVE      REFERRAL TO NEUROLOGY      URINALYSIS W/ RFLX MICROSCOPIC      DRUG SCREEN, URINE      OCCULT BLOOD, IMMUNOASSAY (FIT)      KS COLLECTION VENOUS BLOOD,VENIPUNCTURE   3. Diminished vision H54.7 369.9 CBC WITH AUTOMATED DIFF      LIPID PANEL      METABOLIC PANEL, COMPREHENSIVE      REFERRAL TO NEUROLOGY      OCCULT BLOOD, IMMUNOASSAY (FIT)      KS COLLECTION VENOUS BLOOD,VENIPUNCTURE   4. Anxiety F41.9 300.00 URINALYSIS W/ RFLX MICROSCOPIC      DRUG SCREEN, URINE      OCCULT BLOOD, IMMUNOASSAY (FIT)   5. Hypothyroidism, unspecified type E03.9 244.9 TSH 3RD GENERATION      OCCULT BLOOD, IMMUNOASSAY (FIT)      KS COLLECTION VENOUS BLOOD,VENIPUNCTURE   6. Neuropathy G62.9 355.9 CBC WITH AUTOMATED DIFF      METABOLIC PANEL, COMPREHENSIVE      REFERRAL TO NEUROLOGY      MS COLLECTION VENOUS BLOOD,VENIPUNCTURE   7. Need for hepatitis C screening test Z11.59 V73.89 HEPATITIS C AB      MS COLLECTION VENOUS BLOOD,VENIPUNCTURE   8. Screen for colon cancer Z12.11 V76.51 OCCULT BLOOD, IMMUNOASSAY (FIT)   9. Encounter for immunization Z23 V03.89 CBC WITH AUTOMATED DIFF      TETANUS, DIPHTHERIA TOXOIDS AND ACELLULAR PERTUSSIS VACCINE (TDAP), IN INDIVIDS. >=7, IM     lab results and schedule of future lab studies reviewed with patient  reviewed diet, exercise and weight control  reviewed medications and side effects in detail      I have discussed the diagnosis with the patient and the intended plan of care as seen in the above orders. The patient has received an after-visit summary and questions were answered concerning future plans. I have discussed medication, side effects, and warnings with the patient in detail. The patient verbalized understanding and is in agreement with the plan of care. The patient will contact the office with any additional concerns.     Virgie Tiwari MD

## 2017-02-21 NOTE — MR AVS SNAPSHOT
Visit Information Date & Time Provider Department Dept. Phone Encounter #  
 2/21/2017  2:30 PM Nained Ebonie Crawford MD [facility] [phone] [id number] Follow-up Instructions Return in about 1 week (around 2/28/2017), or if symptoms worsen or fail to improve, for dizziness, migraine, vertigo. Upcoming Health Maintenance Date Due Hepatitis C Screening 1955 PAP AKA CERVICAL CYTOLOGY 11/6/1977 BREAST CANCER SCRN MAMMOGRAM 11/6/2006 FOBT Q 1 YEAR AGE 50-75 11/6/2006 ZOSTER VACCINE AGE 60> 11/6/2016 DTaP/Tdap/Td series (2 - Td) 12/14/2020 Allergies as of 2/21/2017  Review Complete On: 2/21/2017 By: Lulu Beck MD  
  
 Severity Noted Reaction Type Reactions Sulfa (Sulfonamide Antibiotics)  01/26/2017    Rash Current Immunizations  Never Reviewed No immunizations on file. Not reviewed this visit You Were Diagnosed With   
  
 Codes Comments Dizziness    -  Primary ICD-10-CM: M55 ICD-9-CM: 780.4 Diminished vision     ICD-10-CM: H54.7 ICD-9-CM: 369.9 Anxiety     ICD-10-CM: F41.9 ICD-9-CM: 300.00 Migraine with vertigo     ICD-10-CM: G43.109 ICD-9-CM: 346.00 Hypothyroidism, unspecified type     ICD-10-CM: E03.9 ICD-9-CM: 244.9 Neuropathy     ICD-10-CM: G62.9 ICD-9-CM: 355.9 Need for hepatitis C screening test     ICD-10-CM: Z11.59 
ICD-9-CM: V73.89 Screen for colon cancer     ICD-10-CM: Z12.11 ICD-9-CM: V76.51 Vitals BP Pulse Temp Resp Height(growth percentile) Weight(growth percentile) 134/86 (BP 1 Location: Left arm, BP Patient Position: Sitting) 71 97.8 °F (36.6 °C) (Oral) 18 5' 8\" (1.727 m) 211 lb (95.7 kg) SpO2 BMI OB Status Smoking Status 98% 32.08 kg/m2 Hysterectomy Never Smoker BMI and BSA Data Body Mass Index Body Surface Area 32.08 kg/m 2 2.14 m 2 Preferred Pharmacy Pharmacy Name Phone [address] [place] [address]. [address] [facility] [phone] Your Updated Medication List  
  
   
This list is accurate as of: 2/21/17  3:22 PM.  Always use your most recent med list.  
  
  
  
  
 BENICAR 20 mg tablet Generic drug:  olmesartan Take 20 mg by mouth daily. cyclobenzaprine 10 mg tablet Commonly known as:  FLEXERIL Take  by mouth three (3) times daily as needed for Muscle Spasm(s). diazePAM 5 mg tablet Commonly known as:  VALIUM Take 5 mg by mouth every six (6) hours as needed for Anxiety. DYMISTA 137-50 mcg/spray [place] Generic drug:  azelastine-fluticasone  
by Nasal route. EVAMIST 1.53 mg/spray (1.7%) [place] Generic drug:  Estradiol  
by TransDERmal route.  
  
 gabapentin 100 mg capsule Commonly known as:  NEURONTIN Take 2 Caps by mouth three (3) times daily. HYDROcodone-acetaminophen 7.5-500 mg per tablet Commonly known as:  Avelina [place] Take  by mouth every four (4) hours as needed for Pain.  
  
 levothyroxine 50 mcg tablet Commonly known as:  SYNTHROID Take  by mouth Daily (before breakfast). methocarbamol 750 mg tablet Commonly known as:  ROBAXIN Take 1 Tab by mouth three (3) times daily. Omega-3-DHA-EPA-Fish Oil 1,000 mg (120 mg-180 mg) Cap Take  by mouth. venlafaxine-SR 75 mg capsule Commonly known as:  EFFEXOR XR Take 1 Cap by mouth daily. VITAMIN D3 1,000 unit Cap Generic drug:  cholecalciferol Take  by mouth daily. WOMEN'S DAILY MULTIVITAMIN PO Take  by mouth. We Performed the Following REFERRAL TO NEUROLOGY [TVX57 Custom] Comments:  
 Please evaluate patient for migraine with vertigo and neuropathy. Follow-up Instructions Return in about 1 week (around 2/28/2017), or if symptoms worsen or fail to improve, for dizziness, migraine, vertigo. To-Do List   
 02/21/2017 Lab:  CBC WITH AUTOMATED DIFF   
  
 02/21/2017 Lab:  DRUG SCREEN, URINE   
  
 02/21/2017 Lab:  HEPATITIS C AB   
  
 02/21/2017 Lab:  LIPID PANEL   
  
 02/21/2017 Lab:  METABOLIC PANEL, COMPREHENSIVE   
  
 02/21/2017 Lab:  OCCULT BLOOD, IMMUNOASSAY (FIT)   
  
 02/21/2017 Lab:  TSH 3RD GENERATION   
  
 02/21/2017 Lab:  URINALYSIS W/ RFLX MICROSCOPIC Referral Information Referral ID Referred By Referred To  
  
 [id number] Haris Holloway N Not Available Visits Status Start Date End Date 1 New Request 2/21/17 2/21/18 If your referral has a status of pending review or denied, additional information will be sent to support the outcome of this decision. Patient Instructions Migraine Headache: Care Instructions Your Care Instructions Migraines are painful, throbbing headaches that often start on one side of the head. They may cause nausea and vomiting and make you sensitive to light, sound, or smell. Without treatment, migraines can last from 4 hours to a few days. Medicines can help prevent migraines or stop them after they have started. Your doctor can help you find which ones work best for you. Follow-up care is a key part of your treatment and safety. Be sure to make and go to all appointments, and call your doctor if you are having problems. It's also a good idea to know your test results and keep a list of the medicines you take. How can you care for yourself at home? · Do not drive if you have taken a prescription pain medicine. · Rest in a quiet, dark room until your headache is gone. Close your eyes, and try to relax or go to sleep. Don't watch TV or read. · Put a cold, moist cloth or cold pack on the painful area for 10 to 20 minutes at a time. Put a thin cloth between the cold pack and your skin. · Use a warm, moist towel or a heating pad set on low to relax tight shoulder and neck muscles. · Have someone gently massage your neck and shoulders. · Take your medicines exactly as prescribed. Call your doctor if you think you are having a problem with your medicine. You will get more details on the specific medicines your doctor prescribes. · Be careful not to take pain medicine more often than the instructions allow. You could get worse or more frequent headaches when the medicine wears off. To prevent migraines · Keep a headache diary so you can figure out what triggers your headaches. Avoiding triggers may help you prevent headaches. Record when each headache began, how long it lasted, and what the pain was like. (Was it throbbing, aching, stabbing, or dull?) Write down any other symptoms you had with the headache, such as nausea, flashing lights or dark spots, or sensitivity to bright light or loud noise. Note if the headache occurred near your period. List anything that might have triggered the headache. Triggers may include certain foods (chocolate, cheese, wine) or odors, smoke, bright light, stress, or lack of sleep. · If your doctor has prescribed medicine for your migraines, take it as directed. You may have medicine that you take only when you get a migraine and medicine that you take all the time to help prevent migraines. ¨ If your doctor has prescribed medicine for when you get a headache, take it at the first sign of a migraine, unless your doctor has given you other instructions. ¨ If your doctor has prescribed medicine to prevent migraines, take it exactly as prescribed. Call your doctor if you think you are having a problem with your medicine. · Find healthy ways to deal with stress. Migraines are most common during or right after stressful times. Take time to relax before and after you do something that has caused a migraine in the past. 
· Try to keep your muscles relaxed by keeping good posture. Check your jaw, face, neck, and shoulder muscles for tension. Try to relax them.  When you sit at a desk, change positions often. And make sure to stretch for 30 seconds each hour. · Get plenty of sleep and exercise. · Eat meals on a regular schedule. Avoid foods and drinks that often trigger migraines. These include chocolate, alcohol (especially red wine and port), aspartame, monosodium glutamate (MSG), and some additives found in foods (such as hot dogs, valerio, cold cuts, aged cheeses, and pickled foods). · Limit caffeine. Don't drink too much coffee, tea, or soda. But don't quit caffeine suddenly. That can also give you migraines. · Do not smoke or allow others to smoke around you. If you need help quitting, talk to your doctor about stop-smoking programs and medicines. These can increase your chances of quitting for good. · If you are taking birth control pills or hormone therapy, talk to your doctor about whether they are triggering your migraines. When should you call for help? Call 911 anytime you think you may need emergency care. For example, call if: 
· You have signs of a stroke. These may include: 
¨ Sudden numbness, paralysis, or weakness in your face, arm, or leg, especially on only one side of your body. ¨ Sudden vision changes. ¨ Sudden trouble speaking. ¨ Sudden confusion or trouble understanding simple statements. ¨ Sudden problems with walking or balance. ¨ A sudden, severe headache that is different from past headaches. Call your doctor now or seek immediate medical care if: 
· You have new or worse nausea and vomiting. · You have a new or higher fever. · Your headache gets much worse. Watch closely for changes in your health, and be sure to contact your doctor if: 
· You are not getting better after 2 days (48 hours). Where can you learn more? Go to http://dustin-lilia.info/. Enter M775 in the search box to learn more about \"Migraine Headache: Care Instructions. \" Current as of: February 19, 2016 Content Version: 11.1 © 8844-8612 [employer]. Care instructions adapted under license by [employer] (which disclaims liability or warranty for this information). If you have questions about a medical condition or this instruction, always ask your healthcare professional. [address] any warranty or liability for your use of this information. Dizziness: Care Instructions Your Care Instructions Dizziness is the feeling of unsteadiness or fuzziness in your head. It is different than having vertigo, which is a feeling that the room is spinning or that you are moving or falling. It is also different from lightheadedness, which is the feeling that you are about to faint. It can be hard to know what causes dizziness. Some people feel dizzy when they have migraine headaches. Sometimes bouts of flu can make you feel dizzy. Some medical conditions, such as heart problems or high blood pressure, can make you feel dizzy. Many medicines can cause dizziness, including medicines for high blood pressure, pain, or anxiety. If a medicine causes your symptoms, your doctor may recommend that you stop or change the medicine. If it is a problem with your heart, you may need medicine to help your heart work better. If there is no clear reason for your symptoms, your doctor may suggest watching and waiting for a while to see if the dizziness goes away on its own. Follow-up care is a key part of your treatment and safety. Be sure to make and go to all appointments, and call your doctor if you are having problems. It's also a good idea to know your test results and keep a list of the medicines you take. How can you care for yourself at home? · If your doctor recommends or prescribes medicine, take it exactly as directed. Call your doctor if you think you are having a problem with your medicine. · Do not drive while you feel dizzy. · Try to prevent falls. Steps you can take include: ¨ Using nonskid mats, adding grab bars near the tub, and using night-lights. ¨ Clearing your home so that walkways are free of anything you might trip on. ¨ Letting family and friends know that you have been feeling dizzy. This will help them know how to help you. When should you call for help? Call 911 anytime you think you may need emergency care. For example, call if: 
· You passed out (lost consciousness). · You have dizziness along with symptoms of a heart attack. These may include: ¨ Chest pain or pressure, or a strange feeling in the chest. 
¨ Sweating. ¨ Shortness of breath. ¨ Nausea or vomiting. ¨ Pain, pressure, or a strange feeling in the back, neck, jaw, or upper belly or in one or both shoulders or arms. ¨ Lightheadedness or sudden weakness. ¨ A fast or irregular heartbeat. · You have symptoms of a stroke. These may include: 
¨ Sudden numbness, tingling, weakness, or loss of movement in your face, arm, or leg, especially on only one side of your body. ¨ Sudden vision changes. ¨ Sudden trouble speaking. ¨ Sudden confusion or trouble understanding simple statements. ¨ Sudden problems with walking or balance. ¨ A sudden, severe headache that is different from past headaches. Call your doctor now or seek immediate medical care if: 
· You feel dizzy and have a fever, headache, or ringing in your ears. · You have new or increased nausea and vomiting. · Your dizziness does not go away or comes back. Watch closely for changes in your health, and be sure to contact your doctor if: 
· You do not get better as expected. Where can you learn more? Go to http://dustin-lilia.info/. Enter B954 in the search box to learn more about \"Dizziness: Care Instructions. \" Current as of: May 27, 2016 Content Version: 11.1 © 4584-4095 [employer].  Care instructions adapted under license by [employer] (which disclaims liability or warranty for this information). If you have questions about a medical condition or this instruction, always ask your healthcare professional. [address] any warranty or liability for your use of this information. Introducing [facility] SERVICES! Memo Yang introduces Citelighter patient portal. Now you can access parts of your medical record, email your doctor's office, and request medication refills online. 1. In your internet browser, go to [email or web address] 2. Click on the First Time User? Click Here link in the Sign In box. You will see the New Member Sign Up page. 3. Enter your Citelighter Access Code exactly as it appears below. You will not need to use this code after youve completed the sign-up process. If you do not sign up before the expiration date, you must request a new code. · Citelighter Access Code: [email or web address] Expires: 4/26/2017  1:35 PM 
 
4. Enter the last four digits of your Social Security Number (xxxx) and Date of Birth (mm/dd/yyyy) as indicated and click Submit. You will be taken to the next sign-up page. 5. Create a Citelighter ID. This will be your Citelighter login ID and cannot be changed, so think of one that is secure and easy to remember. 6. Create a Citelighter password. You can change your password at any time. 7. Enter your Password Reset Question and Answer. This can be used at a later time if you forget your password. 8. Enter your e-mail address. You will receive e-mail notification when new information is available in [address] 9. Click Sign Up. You can now view and download portions of your medical record. 10. Click the Download Summary menu link to download a portable copy of your medical information. If you have questions, please visit the Frequently Asked Questions section of the Citelighter website. Remember, Citelighter is NOT to be used for urgent needs. For medical emergencies, dial 911. Now available from your iPhone and Android! Please provide this summary of care documentation to your next provider. Your primary care clinician is listed as Berta Rivera. If you have any questions after today's visit, please call [phone].

## 2017-02-21 NOTE — PATIENT INSTRUCTIONS
Migraine Headache: Care Instructions  Your Care Instructions  Migraines are painful, throbbing headaches that often start on one side of the head. They may cause nausea and vomiting and make you sensitive to light, sound, or smell. Without treatment, migraines can last from 4 hours to a few days. Medicines can help prevent migraines or stop them after they have started. Your doctor can help you find which ones work best for you. Follow-up care is a key part of your treatment and safety. Be sure to make and go to all appointments, and call your doctor if you are having problems. It's also a good idea to know your test results and keep a list of the medicines you take. How can you care for yourself at home? · Do not drive if you have taken a prescription pain medicine. · Rest in a quiet, dark room until your headache is gone. Close your eyes, and try to relax or go to sleep. Don't watch TV or read. · Put a cold, moist cloth or cold pack on the painful area for 10 to 20 minutes at a time. Put a thin cloth between the cold pack and your skin. · Use a warm, moist towel or a heating pad set on low to relax tight shoulder and neck muscles. · Have someone gently massage your neck and shoulders. · Take your medicines exactly as prescribed. Call your doctor if you think you are having a problem with your medicine. You will get more details on the specific medicines your doctor prescribes. · Be careful not to take pain medicine more often than the instructions allow. You could get worse or more frequent headaches when the medicine wears off. To prevent migraines  · Keep a headache diary so you can figure out what triggers your headaches. Avoiding triggers may help you prevent headaches. Record when each headache began, how long it lasted, and what the pain was like.  (Was it throbbing, aching, stabbing, or dull?) Write down any other symptoms you had with the headache, such as nausea, flashing lights or dark spots, or sensitivity to bright light or loud noise. Note if the headache occurred near your period. List anything that might have triggered the headache. Triggers may include certain foods (chocolate, cheese, wine) or odors, smoke, bright light, stress, or lack of sleep. · If your doctor has prescribed medicine for your migraines, take it as directed. You may have medicine that you take only when you get a migraine and medicine that you take all the time to help prevent migraines. ¨ If your doctor has prescribed medicine for when you get a headache, take it at the first sign of a migraine, unless your doctor has given you other instructions. ¨ If your doctor has prescribed medicine to prevent migraines, take it exactly as prescribed. Call your doctor if you think you are having a problem with your medicine. · Find healthy ways to deal with stress. Migraines are most common during or right after stressful times. Take time to relax before and after you do something that has caused a migraine in the past.  · Try to keep your muscles relaxed by keeping good posture. Check your jaw, face, neck, and shoulder muscles for tension. Try to relax them. When you sit at a desk, change positions often. And make sure to stretch for 30 seconds each hour. · Get plenty of sleep and exercise. · Eat meals on a regular schedule. Avoid foods and drinks that often trigger migraines. These include chocolate, alcohol (especially red wine and port), aspartame, monosodium glutamate (MSG), and some additives found in foods (such as hot dogs, valerio, cold cuts, aged cheeses, and pickled foods). · Limit caffeine. Don't drink too much coffee, tea, or soda. But don't quit caffeine suddenly. That can also give you migraines. · Do not smoke or allow others to smoke around you. If you need help quitting, talk to your doctor about stop-smoking programs and medicines. These can increase your chances of quitting for good.   · If you are taking birth control pills or hormone therapy, talk to your doctor about whether they are triggering your migraines. When should you call for help? Call 911 anytime you think you may need emergency care. For example, call if:  · You have signs of a stroke. These may include:  ¨ Sudden numbness, paralysis, or weakness in your face, arm, or leg, especially on only one side of your body. ¨ Sudden vision changes. ¨ Sudden trouble speaking. ¨ Sudden confusion or trouble understanding simple statements. ¨ Sudden problems with walking or balance. ¨ A sudden, severe headache that is different from past headaches. Call your doctor now or seek immediate medical care if:  · You have new or worse nausea and vomiting. · You have a new or higher fever. · Your headache gets much worse. Watch closely for changes in your health, and be sure to contact your doctor if:  · You are not getting better after 2 days (48 hours). Where can you learn more? Go to http://dustin-lilia.info/. Enter D721 in the search box to learn more about \"Migraine Headache: Care Instructions. \"  Current as of: February 19, 2016  Content Version: 11.1  © 2871-3525 [employer]. Care instructions adapted under license by [employer] (which disclaims liability or warranty for this information). If you have questions about a medical condition or this instruction, always ask your healthcare professional. [address] any warranty or liability for your use of this information. Dizziness: Care Instructions  Your Care Instructions  Dizziness is the feeling of unsteadiness or fuzziness in your head. It is different than having vertigo, which is a feeling that the room is spinning or that you are moving or falling. It is also different from lightheadedness, which is the feeling that you are about to faint. It can be hard to know what causes dizziness.  Some people feel dizzy when they have migraine headaches. Sometimes bouts of flu can make you feel dizzy. Some medical conditions, such as heart problems or high blood pressure, can make you feel dizzy. Many medicines can cause dizziness, including medicines for high blood pressure, pain, or anxiety. If a medicine causes your symptoms, your doctor may recommend that you stop or change the medicine. If it is a problem with your heart, you may need medicine to help your heart work better. If there is no clear reason for your symptoms, your doctor may suggest watching and waiting for a while to see if the dizziness goes away on its own. Follow-up care is a key part of your treatment and safety. Be sure to make and go to all appointments, and call your doctor if you are having problems. It's also a good idea to know your test results and keep a list of the medicines you take. How can you care for yourself at home? · If your doctor recommends or prescribes medicine, take it exactly as directed. Call your doctor if you think you are having a problem with your medicine. · Do not drive while you feel dizzy. · Try to prevent falls. Steps you can take include:  ¨ Using nonskid mats, adding grab bars near the tub, and using night-lights. ¨ Clearing your home so that walkways are free of anything you might trip on. ¨ Letting family and friends know that you have been feeling dizzy. This will help them know how to help you. When should you call for help? Call 911 anytime you think you may need emergency care. For example, call if:  · You passed out (lost consciousness). · You have dizziness along with symptoms of a heart attack. These may include:  ¨ Chest pain or pressure, or a strange feeling in the chest.  ¨ Sweating. ¨ Shortness of breath. ¨ Nausea or vomiting. ¨ Pain, pressure, or a strange feeling in the back, neck, jaw, or upper belly or in one or both shoulders or arms. ¨ Lightheadedness or sudden weakness.   ¨ A fast or irregular heartbeat. · You have symptoms of a stroke. These may include:  ¨ Sudden numbness, tingling, weakness, or loss of movement in your face, arm, or leg, especially on only one side of your body. ¨ Sudden vision changes. ¨ Sudden trouble speaking. ¨ Sudden confusion or trouble understanding simple statements. ¨ Sudden problems with walking or balance. ¨ A sudden, severe headache that is different from past headaches. Call your doctor now or seek immediate medical care if:  · You feel dizzy and have a fever, headache, or ringing in your ears. · You have new or increased nausea and vomiting. · Your dizziness does not go away or comes back. Watch closely for changes in your health, and be sure to contact your doctor if:  · You do not get better as expected. Where can you learn more? Go to http://dustin-lilia.info/. Enter R229 in the search box to learn more about \"Dizziness: Care Instructions. \"  Current as of: May 27, 2016  Content Version: 11.1  © 3309-0105 [employer]. Care instructions adapted under license by [employer] (which disclaims liability or warranty for this information). If you have questions about a medical condition or this instruction, always ask your healthcare professional. [place] [address] any warranty or liability for your use of this information.

## 2017-02-22 ENCOUNTER — HOSPITAL ENCOUNTER (OUTPATIENT)
Dept: LAB | Age: 62
Discharge: HOME | End: 2017-02-22
Payer: COMMERCIAL

## 2017-02-22 DIAGNOSIS — E03.9 HYPOTHYROIDISM, UNSPECIFIED TYPE: ICD-10-CM

## 2017-02-22 DIAGNOSIS — G43.109 MIGRAINE WITH VERTIGO: ICD-10-CM

## 2017-02-22 DIAGNOSIS — H54.7 DIMINISHED VISION: ICD-10-CM

## 2017-02-22 DIAGNOSIS — Z12.11 SCREEN FOR COLON CANCER: ICD-10-CM

## 2017-02-22 DIAGNOSIS — R42 DIZZINESS: ICD-10-CM

## 2017-02-22 DIAGNOSIS — F41.9 ANXIETY: ICD-10-CM

## 2017-02-22 LAB
HCV AB SER IA-ACNC: 0.04 INDEX
HCV AB SERPL QL IA: NEGATIVE
HCV COMMENT,HCGAC: NORMAL

## 2017-02-22 PROCEDURE — 82274 ASSAY TEST FOR BLOOD FECAL: CPT | Performed by: FAMILY MEDICINE

## 2017-02-27 LAB — HEMOCCULT STL QL IA: NEGATIVE

## 2017-02-28 ENCOUNTER — OFFICE VISIT (OUTPATIENT)
Dept: FAMILY MEDICINE CLINIC | Age: 62
End: 2017-02-28

## 2017-02-28 VITALS
HEIGHT: 68 IN | OXYGEN SATURATION: 97 % | WEIGHT: 209 LBS | TEMPERATURE: 96.3 F | SYSTOLIC BLOOD PRESSURE: 130 MMHG | BODY MASS INDEX: 31.67 KG/M2 | DIASTOLIC BLOOD PRESSURE: 94 MMHG | HEART RATE: 77 BPM | RESPIRATION RATE: 18 BRPM

## 2017-02-28 DIAGNOSIS — G43.109 MIGRAINE WITH VERTIGO: Primary | ICD-10-CM

## 2017-02-28 DIAGNOSIS — I10 ESSENTIAL HYPERTENSION: ICD-10-CM

## 2017-02-28 DIAGNOSIS — R74.8 ELEVATED LIVER ENZYMES: ICD-10-CM

## 2017-02-28 RX ORDER — DIAZEPAM 5 MG/1
5 TABLET ORAL
Qty: 30 TAB | Refills: 0 | Status: SHIPPED | OUTPATIENT
Start: 2017-02-28 | End: 2018-03-01

## 2017-02-28 RX ORDER — OLMESARTAN MEDOXOMIL 20 MG/1
20 TABLET, FILM COATED ORAL DAILY
Qty: 90 TAB | Refills: 1 | Status: SHIPPED | OUTPATIENT
Start: 2017-02-28 | End: 2017-12-08 | Stop reason: SDUPTHER

## 2017-02-28 NOTE — PATIENT INSTRUCTIONS
Liver Function Tests: About These Tests  What is it? Liver function tests check how well your liver is working. Some tests measure the amount of certain enzymes in your blood to check whether the liver is damaged or inflamed. Other tests measure how well the liver can make important proteins or clear waste products from the body. Your doctor will use the test results to help look for certain conditions, such as hepatitis, cirrhosis, or gallbladder problems. Test results that are not normal do not always mean there is a problem with your liver. Your doctor can determine if there is a problem based on your results. The tests may include:  · Alkaline phosphatase (ALP). This test measures the amount of the enzyme ALP in your blood. · Total protein. A total serum protein test measures the amounts of two major groups of proteins in your blood (albumin and globulin) and the total amount of protein. · Bilirubin. This test measures the amount of bilirubin in your blood. When bilirubin levels are high, the skin and whites of the eyes may appear yellow (jaundice). This may be caused by liver disease. · Aspartate aminotransferase (AST) and alanine aminotransferase (ALT). These tests measure the amount of the enzymes AST and ALT in your blood. (Aminotransferase is also known as a transaminase. High levels may be called transaminitis.)  Why is this test done? Liver function tests check how well your liver is working. Some tests help show whether your liver is damaged or inflamed. Your doctor may order liver function tests if you have symptoms of liver disease. These tests also may be done to see how well a treatment for liver disease is working. How can you prepare for the test?  In general, you do not need to prepare before having these tests. Your doctor may give you some specific instructions to prepare. What happens during the test?  A health professional takes a sample of your blood.   What happens after the test?  You will probably be able to go home right away. When should you call for help? Watch closely for changes in your health, and be sure to contact your doctor if you have any problems. Follow-up care is a key part of your treatment and safety. Be sure to make and go to all appointments, and call your doctor if you are having problems. It's also a good idea to keep a list of the medicines you take. Ask your doctor when you can expect to have your test results. Where can you learn more? Go to http://dustin-lilia.info/. Enter V320 in the search box to learn more about \"Liver Function Tests: About These Tests. \"  Current as of: February 19, 2016  Content Version: 11.1  © 6178-6677 [employer]. Care instructions adapted under license by [employer] (which disclaims liability or warranty for this information). If you have questions about a medical condition or this instruction, always ask your healthcare professional. [place] [address] any warranty or liability for your use of this information. Migraine Headache: Care Instructions  Your Care Instructions  Migraines are painful, throbbing headaches that often start on one side of the head. They may cause nausea and vomiting and make you sensitive to light, sound, or smell. Without treatment, migraines can last from 4 hours to a few days. Medicines can help prevent migraines or stop them after they have started. Your doctor can help you find which ones work best for you. Follow-up care is a key part of your treatment and safety. Be sure to make and go to all appointments, and call your doctor if you are having problems. It's also a good idea to know your test results and keep a list of the medicines you take. How can you care for yourself at home? · Do not drive if you have taken a prescription pain medicine. · Rest in a quiet, dark room until your headache is gone.  Close your eyes, and try to relax or go to sleep. Don't watch TV or read. · Put a cold, moist cloth or cold pack on the painful area for 10 to 20 minutes at a time. Put a thin cloth between the cold pack and your skin. · Use a warm, moist towel or a heating pad set on low to relax tight shoulder and neck muscles. · Have someone gently massage your neck and shoulders. · Take your medicines exactly as prescribed. Call your doctor if you think you are having a problem with your medicine. You will get more details on the specific medicines your doctor prescribes. · Be careful not to take pain medicine more often than the instructions allow. You could get worse or more frequent headaches when the medicine wears off. To prevent migraines  · Keep a headache diary so you can figure out what triggers your headaches. Avoiding triggers may help you prevent headaches. Record when each headache began, how long it lasted, and what the pain was like. (Was it throbbing, aching, stabbing, or dull?) Write down any other symptoms you had with the headache, such as nausea, flashing lights or dark spots, or sensitivity to bright light or loud noise. Note if the headache occurred near your period. List anything that might have triggered the headache. Triggers may include certain foods (chocolate, cheese, wine) or odors, smoke, bright light, stress, or lack of sleep. · If your doctor has prescribed medicine for your migraines, take it as directed. You may have medicine that you take only when you get a migraine and medicine that you take all the time to help prevent migraines. ¨ If your doctor has prescribed medicine for when you get a headache, take it at the first sign of a migraine, unless your doctor has given you other instructions. ¨ If your doctor has prescribed medicine to prevent migraines, take it exactly as prescribed. Call your doctor if you think you are having a problem with your medicine. · Find healthy ways to deal with stress.  Migraines are most common during or right after stressful times. Take time to relax before and after you do something that has caused a migraine in the past.  · Try to keep your muscles relaxed by keeping good posture. Check your jaw, face, neck, and shoulder muscles for tension. Try to relax them. When you sit at a desk, change positions often. And make sure to stretch for 30 seconds each hour. · Get plenty of sleep and exercise. · Eat meals on a regular schedule. Avoid foods and drinks that often trigger migraines. These include chocolate, alcohol (especially red wine and port), aspartame, monosodium glutamate (MSG), and some additives found in foods (such as hot dogs, valerio, cold cuts, aged cheeses, and pickled foods). · Limit caffeine. Don't drink too much coffee, tea, or soda. But don't quit caffeine suddenly. That can also give you migraines. · Do not smoke or allow others to smoke around you. If you need help quitting, talk to your doctor about stop-smoking programs and medicines. These can increase your chances of quitting for good. · If you are taking birth control pills or hormone therapy, talk to your doctor about whether they are triggering your migraines. When should you call for help? Call 911 anytime you think you may need emergency care. For example, call if:  · You have signs of a stroke. These may include:  ¨ Sudden numbness, paralysis, or weakness in your face, arm, or leg, especially on only one side of your body. ¨ Sudden vision changes. ¨ Sudden trouble speaking. ¨ Sudden confusion or trouble understanding simple statements. ¨ Sudden problems with walking or balance. ¨ A sudden, severe headache that is different from past headaches. Call your doctor now or seek immediate medical care if:  · You have new or worse nausea and vomiting. · You have a new or higher fever. · Your headache gets much worse.   Watch closely for changes in your health, and be sure to contact your doctor if:  · You are not getting better after 2 days (48 hours). Where can you learn more? Go to http://dustin-lilia.info/. Enter Z299 in the search box to learn more about \"Migraine Headache: Care Instructions. \"  Current as of: February 19, 2016  Content Version: 11.1  © 6206-4695 [employer]. Care instructions adapted under license by [employer] (which disclaims liability or warranty for this information). If you have questions about a medical condition or this instruction, always ask your healthcare professional. [address] any warranty or liability for your use of this information.

## 2017-02-28 NOTE — MR AVS SNAPSHOT
Visit Information Date & Time Provider Department Dept. Phone Encounter #  
 2/28/2017  3:00 PM Berta Pinon MD [facility] [phone] Follow-up Instructions Return in about 1 month (around 3/28/2017), or if symptoms worsen or fail to improve, for migraine, elevated liver enzymes. Upcoming Health Maintenance Date Due  
 PAP AKA CERVICAL CYTOLOGY 11/6/1977 ZOSTER VACCINE AGE 60> 11/6/2016 FOBT Q 1 YEAR AGE 50-75 2/22/2018 BREAST CANCER SCRN MAMMOGRAM 6/28/2018 DTaP/Tdap/Td series (3 - Td) 2/21/2027 Allergies as of 2/28/2017  Review Complete On: 2/28/2017 By: Jose Victoria MD  
  
 Severity Noted Reaction Type Reactions Sulfa (Sulfonamide Antibiotics)  01/26/2017    Rash Current Immunizations  Reviewed on 2/21/2017 Name Date Tdap 2/21/2017 Not reviewed this visit You Were Diagnosed With   
  
 Codes Comments Elevated liver enzymes    -  Primary ICD-10-CM: R74.8 ICD-9-CM: 790.5 Essential hypertension     ICD-10-CM: I10 
ICD-9-CM: 401.9 Migraine with vertigo     ICD-10-CM: G43.109 ICD-9-CM: 346.00 Vitals BP  
  
  
  
  
  
 (!) 130/94 (BP 1 Location: Left arm, BP Patient Position: Sitting) Vitals History BMI and BSA Data Body Mass Index Body Surface Area 31.78 kg/m 2 2.13 m 2 Preferred Pharmacy Pharmacy Name Phone [address] [place] [address]. [address] [phone] Your Updated Medication List  
  
   
This list is accurate as of: 2/28/17  3:53 PM.  Always use your most recent med list.  
  
  
  
  
 BENICAR 20 mg tablet Generic drug:  olmesartan Take 1 Tab by mouth daily. cyclobenzaprine 10 mg tablet Commonly known as:  FLEXERIL Take  by mouth three (3) times daily as needed for Muscle Spasm(s). diazePAM 5 mg tablet Commonly known as:  VALIUM  
 Take 1 Tab by mouth daily as needed for Anxiety (vertigo). DYMISTA 137-50 mcg/spray Villalba Generic drug:  azelastine-fluticasone  
by Nasal route. EVAMIST 1.53 mg/spray (1.7%) Spry Generic drug:  Estradiol  
by TransDERmal route.  
  
 gabapentin 100 mg capsule Commonly known as:  NEURONTIN Take 2 Caps by mouth three (3) times daily. HYDROcodone-acetaminophen 7.5-500 mg per tablet Commonly known as:  Oziel Nino Take  by mouth every four (4) hours as needed for Pain.  
  
 levothyroxine 50 mcg tablet Commonly known as:  SYNTHROID Take  by mouth Daily (before breakfast). methocarbamol 750 mg tablet Commonly known as:  ROBAXIN Take 1 Tab by mouth three (3) times daily. Omega-3-DHA-EPA-Fish Oil 1,000 mg (120 mg-180 mg) Cap Take  by mouth. venlafaxine-SR 75 mg capsule Commonly known as:  EFFEXOR XR Take 1 Cap by mouth daily. VITAMIN D3 1,000 unit Cap Generic drug:  cholecalciferol Take  by mouth daily. WOMEN'S DAILY MULTIVITAMIN PO Take  by mouth. Prescriptions Printed Refills  
 diazePAM (VALIUM) 5 mg tablet 0 Sig: Take 1 Tab by mouth daily as needed for Anxiety (vertigo). Class: Print Route: Oral  
  
Prescriptions Sent to Pharmacy Refills BENICAR 20 mg tablet 1 Sig: Take 1 Tab by mouth daily. Class: Normal  
 Pharmacy: [facility], [place] - [address]  #: [phone] Route: Oral  
  
We Performed the Following REFERRAL TO GASTROENTEROLOGY [AJE45 Custom] Comments:  
 Please evaluate patient for elevated liver enzymes. Follow-up Instructions Return in about 1 month (around 3/28/2017), or if symptoms worsen or fail to improve, for migraine, elevated liver enzymes. To-Do List   
 02/28/2017 Imaging:  US ABD LTD Referral Information Referral ID Referred By Referred To  
  
 [id number] Cyndie BARON Not Available Visits Status Start Date End Date 1 New Request 2/28/17 2/28/18 If your referral has a status of pending review or denied, additional information will be sent to support the outcome of this decision. Patient Instructions Liver Function Tests: About These Tests What is it? Liver function tests check how well your liver is working. Some tests measure the amount of certain enzymes in your blood to check whether the liver is damaged or inflamed. Other tests measure how well the liver can make important proteins or clear waste products from the body. Your doctor will use the test results to help look for certain conditions, such as hepatitis, cirrhosis, or gallbladder problems. Test results that are not normal do not always mean there is a problem with your liver. Your doctor can determine if there is a problem based on your results. The tests may include: · Alkaline phosphatase (ALP). This test measures the amount of the enzyme ALP in your blood. · Total protein. A total serum protein test measures the amounts of two major groups of proteins in your blood (albumin and globulin) and the total amount of protein. · Bilirubin. This test measures the amount of bilirubin in your blood. When bilirubin levels are high, the skin and whites of the eyes may appear yellow (jaundice). This may be caused by liver disease. · Aspartate aminotransferase (AST) and alanine aminotransferase (ALT). These tests measure the amount of the enzymes AST and ALT in your blood. (Aminotransferase is also known as a transaminase. High levels may be called transaminitis.) Why is this test done? Liver function tests check how well your liver is working. Some tests help show whether your liver is damaged or inflamed. Your doctor may order liver function tests if you have symptoms of liver disease. These tests also may be done to see how well a treatment for liver disease is working.  
How can you prepare for the test? 
 In general, you do not need to prepare before having these tests. Your doctor may give you some specific instructions to prepare. What happens during the test? 
A health professional takes a sample of your blood. What happens after the test? 
You will probably be able to go home right away. When should you call for help? Watch closely for changes in your health, and be sure to contact your doctor if you have any problems. Follow-up care is a key part of your treatment and safety. Be sure to make and go to all appointments, and call your doctor if you are having problems. It's also a good idea to keep a list of the medicines you take. Ask your doctor when you can expect to have your test results. Where can you learn more? Go to http://dustin-lilia.info/. Enter X122 in the search box to learn more about \"Liver Function Tests: About These Tests. \" Current as of: February 19, 2016 Content Version: 11.1 © 0238-4970 [employer]. Care instructions adapted under license by [facility] (which disclaims liability or warranty for this information). If you have questions about a medical condition or this instruction, always ask your healthcare professional. [address] any warranty or liability for your use of this information. Migraine Headache: Care Instructions Your Care Instructions Migraines are painful, throbbing headaches that often start on one side of the head. They may cause nausea and vomiting and make you sensitive to light, sound, or smell. Without treatment, migraines can last from 4 hours to a few days. Medicines can help prevent migraines or stop them after they have started. Your doctor can help you find which ones work best for you. Follow-up care is a key part of your treatment and safety.  Be sure to make and go to all appointments, and call your doctor if you are having problems. It's also a good idea to know your test results and keep a list of the medicines you take. How can you care for yourself at home? · Do not drive if you have taken a prescription pain medicine. · Rest in a quiet, dark room until your headache is gone. Close your eyes, and try to relax or go to sleep. Don't watch TV or read. · Put a cold, moist cloth or cold pack on the painful area for 10 to 20 minutes at a time. Put a thin cloth between the cold pack and your skin. · Use a warm, moist towel or a heating pad set on low to relax tight shoulder and neck muscles. · Have someone gently massage your neck and shoulders. · Take your medicines exactly as prescribed. Call your doctor if you think you are having a problem with your medicine. You will get more details on the specific medicines your doctor prescribes. · Be careful not to take pain medicine more often than the instructions allow. You could get worse or more frequent headaches when the medicine wears off. To prevent migraines · Keep a headache diary so you can figure out what triggers your headaches. Avoiding triggers may help you prevent headaches. Record when each headache began, how long it lasted, and what the pain was like. (Was it throbbing, aching, stabbing, or dull?) Write down any other symptoms you had with the headache, such as nausea, flashing lights or dark spots, or sensitivity to bright light or loud noise. Note if the headache occurred near your period. List anything that might have triggered the headache. Triggers may include certain foods (chocolate, cheese, wine) or odors, smoke, bright light, stress, or lack of sleep. · If your doctor has prescribed medicine for your migraines, take it as directed. You may have medicine that you take only when you get a migraine and medicine that you take all the time to help prevent migraines.  
¨ If your doctor has prescribed medicine for when you get a headache, take it at the first sign of a migraine, unless your doctor has given you other instructions. ¨ If your doctor has prescribed medicine to prevent migraines, take it exactly as prescribed. Call your doctor if you think you are having a problem with your medicine. · Find healthy ways to deal with stress. Migraines are most common during or right after stressful times. Take time to relax before and after you do something that has caused a migraine in the past. 
· Try to keep your muscles relaxed by keeping good posture. Check your jaw, face, neck, and shoulder muscles for tension. Try to relax them. When you sit at a desk, change positions often. And make sure to stretch for 30 seconds each hour. · Get plenty of sleep and exercise. · Eat meals on a regular schedule. Avoid foods and drinks that often trigger migraines. These include chocolate, alcohol (especially red wine and port), aspartame, monosodium glutamate (MSG), and some additives found in foods (such as hot dogs, valerio, cold cuts, aged cheeses, and pickled foods). · Limit caffeine. Don't drink too much coffee, tea, or soda. But don't quit caffeine suddenly. That can also give you migraines. · Do not smoke or allow others to smoke around you. If you need help quitting, talk to your doctor about stop-smoking programs and medicines. These can increase your chances of quitting for good. · If you are taking birth control pills or hormone therapy, talk to your doctor about whether they are triggering your migraines. When should you call for help? Call 911 anytime you think you may need emergency care. For example, call if: 
· You have signs of a stroke. These may include: 
¨ Sudden numbness, paralysis, or weakness in your face, arm, or leg, especially on only one side of your body. ¨ Sudden vision changes. ¨ Sudden trouble speaking. ¨ Sudden confusion or trouble understanding simple statements. ¨ Sudden problems with walking or balance. ¨ A sudden, severe headache that is different from past headaches. Call your doctor now or seek immediate medical care if: 
· You have new or worse nausea and vomiting. · You have a new or higher fever. · Your headache gets much worse. Watch closely for changes in your health, and be sure to contact your doctor if: 
· You are not getting better after 2 days (48 hours). Where can you learn more? Go to http://dustin-lilia.info/. Enter L519 in the search box to learn more about \"Migraine Headache: Care Instructions. \" Current as of: February 19, 2016 Content Version: 11.1 © 4244-1950 [employer]. Care instructions adapted under license by [employer] (which disclaims liability or warranty for this information). If you have questions about a medical condition or this instruction, always ask your healthcare professional. [address] any warranty or liability for your use of this information. Introducing \[facility] [place] [facility]\"" & HEALTH SERVICES! [employer] introduces Pokelabo patient portal. Now you can access parts of your medical record, email your doctor's office, and request medication refills online. 1. In your internet browser, go to [email or web address] 2. Click on the First Time User? Click Here link in the Sign In box. You will see the New Member Sign Up page. 3. Enter your Pokelabo Access Code exactly as it appears below. You will not need to use this code after youve completed the sign-up process. If you do not sign up before the expiration date, you must request a new code. · Pokelabo Access Code: [email or web address] Expires: 4/26/2017  1:35 PM 
 
4. Enter the last four digits of your Social Security Number (xxxx) and Date of Birth (mm/dd/yyyy) as indicated and click Submit. You will be taken to the next sign-up page. 5. Create a Pokelabo ID.  This will be your Pokelabo login ID and cannot be changed, so think of one that is secure and easy to remember. 6. Create a Wedivite password. You can change your password at any time. 7. Enter your Password Reset Question and Answer. This can be used at a later time if you forget your password. 8. Enter your e-mail address. You will receive e-mail notification when new information is available in [address] 9. Click Sign Up. You can now view and download portions of your medical record. 10. Click the Download Summary menu link to download a portable copy of your medical information. If you have questions, please visit the Frequently Asked Questions section of the Wedivite website. Remember, Wedivite is NOT to be used for urgent needs. For medical emergencies, dial 911. Now available from your iPhone and Android! Please provide this summary of care documentation to your next provider. Your primary care clinician is listed as Berta Rivera. If you have any questions after today's visit, please call [phone].

## 2017-02-28 NOTE — PROGRESS NOTES
Chief Complaint   Patient presents with    Follow-up     Patient states she had a rough weekend, yesterday and today have been better. Patient states she has minor headaches, blurred vision, dizziness. 1. Have you been to the ER, urgent care clinic since your last visit? Hospitalized since your last visit? No    2. Have you seen or consulted any other health care providers outside of the [address] [place] [address] since your last visit? Include any pap smears or colon screening. No    Patient here today following up for dizziness, blurred vision and migraines. Patient states she didn't feel well over the weekend, but has been feeling better the past couple days. Minor headaches, blurred vision and dizziness. HPI  Susan Cohen comes in for f/u care. Patient has h/o migraine with vertigo. She has seen neurologist and placed on many medications that have not helped and finally was put on diazepam which has been the only thing that helps. I have referred her neurology but she awaits an appointment. Would like refill of diazepam. I did have her sign controlled substance contract. Will only give 30 tabs at a time. Looked at her  and no abuse noted. Chronic pain: still awaits response from pain management. Will try to get her into a different pain clinic. This is chronic neck and back pain, she has scoliosis. Elevated liver enzymes: this has been chronic for the patient. Will set her up for gastroenterology eval and liver us.       Past Medical History  Past Medical History:   Diagnosis Date    Headache     Hypertension     Scoliosis     Thyroid disease     Vertigo        Surgical History  Past Surgical History:   Procedure Laterality Date    HX BREAST AUGMENTATION  11/28/2006    HX CHOLECYSTECTOMY      HX COLONOSCOPY  08/2014    HX GASTRIC BYPASS  10/25/2001    HX HYSTERECTOMY  03/17/2003        Medications  Current Outpatient Prescriptions   Medication Sig Dispense Refill    BENICAR 20 mg tablet Take 1 Tab by mouth daily. 90 Tab 1    diazePAM (VALIUM) 5 mg tablet Take 1 Tab by mouth daily as needed for Anxiety (vertigo). 30 Tab 0    levothyroxine (SYNTHROID) 50 mcg tablet Take  by mouth Daily (before breakfast).  cholecalciferol (VITAMIN D3) 1,000 unit cap Take  by mouth daily.  Omega-3-DHA-EPA-Fish Oil 1,000 mg (120 mg-180 mg) cap Take  by mouth.  MULTIVIT WITH CALCIUM,IRON,MIN (WOMEN'S DAILY MULTIVITAMIN PO) Take  by mouth.  Estradiol (EVAMIST) 1.53 mg/spray (1.7%) spry by TransDERmal route.  azelastine-fluticasone (DYMISTA) 137-50 mcg/spray spry by Nasal route.  cyclobenzaprine (FLEXERIL) 10 mg tablet Take  by mouth three (3) times daily as needed for Muscle Spasm(s).  gabapentin (NEURONTIN) 100 mg capsule Take 2 Caps by mouth three (3) times daily. 540 Cap 1    venlafaxine-SR (EFFEXOR XR) 75 mg capsule Take 1 Cap by mouth daily. 90 Cap 1    methocarbamol (ROBAXIN) 750 mg tablet Take 1 Tab by mouth three (3) times daily. 270 Tab 1    HYDROcodone-acetaminophen (LORTAB) 7.5-500 mg per tablet Take  by mouth every four (4) hours as needed for Pain. Allergies  Allergies   Allergen Reactions    Sulfa (Sulfonamide Antibiotics) Rash       Family History  Family History   Problem Relation Age of Onset    Elevated Lipids Mother     Hypertension Brother        Social History  Social History     Social History    Marital status:      Spouse name: N/A    Number of children: N/A    Years of education: N/A     Occupational History    Not on file.      Social History Main Topics    Smoking status: Never Smoker    Smokeless tobacco: Never Used    Alcohol use 1.8 oz/week     3 Glasses of wine per week    Drug use: No    Sexual activity: Yes     Partners: Male     Birth control/ protection: None     Other Topics Concern    Not on file     Social History Narrative       Review of Systems  Review of Systems - History obtained from chart review and the patient  General ROS: positive for  - fatigue and malaise  Psychological ROS: negative  Ophthalmic ROS: negative  ENT ROS: vertigo  Allergy and Immunology ROS: negative  Respiratory ROS: no cough, shortness of breath, or wheezing  Cardiovascular ROS: no chest pain or dyspnea on exertion  Gastrointestinal ROS: no abdominal pain, change in bowel habits, or black or bloody stools  Genito-Urinary ROS: no dysuria, trouble voiding, or hematuria  Musculoskeletal ROS: positive for - pain in back - lower and neck - both sides  Neurological ROS: positive for - dizziness and headaches    Vital Signs  Visit Vitals    BP (!) 130/94 (BP 1 Location: Left arm, BP Patient Position: Sitting)    Pulse 77    Temp 96.3 °F (35.7 °C) (Temporal)    Resp 18    Ht 5' 8\" (1.727 m)    Wt 209 lb (94.8 kg)    SpO2 97%    BMI 31.78 kg/m2         Physical Exam  Physical Examination: General appearance - oriented to person, place, and time, acyanotic, in no respiratory distress and well hydrated  Mental status - alert, oriented to person, place, and time, affect appropriate to mood  Eyes - sclera anicteric  Neck - limited ROM due to discomfort  Chest - clear to auscultation, no wheezes, rales or rhonchi, symmetric air entry  Heart - normal rate, regular rhythm, normal S1, S2, no murmurs, rubs, clicks or gallops  Back exam - limited range of motion, pain with motion noted during exam  Neurological - motor and sensory grossly normal bilaterally    Diagnostics  Orders Placed This Encounter    US ABD LTD     Standing Status:   Future     Standing Expiration Date:   3/28/2018     Order Specific Question:   Specific Body Part     Answer:   liver    REFERRAL TO GASTROENTEROLOGY     Referral Priority:   Routine     Referral Type:   Consultation     Referral Reason:   Specialty Services Required    BENICAR 20 mg tablet     Sig: Take 1 Tab by mouth daily.      Dispense:  90 Tab     Refill:  1    diazePAM (VALIUM) 5 mg tablet     Sig: Take 1 Tab by mouth daily as needed for Anxiety (vertigo). Dispense:  30 Tab     Refill:  0         Results  Results for orders placed or performed during the hospital encounter of 02/22/17   OCCULT BLOOD, IMMUNOASSAY (FIT)   Result Value Ref Range    Occult Blood fecal, by IA NEGATIVE  NEGATIVE       ASSESSMENT and PLAN    ICD-10-CM ICD-9-CM    1. Elevated liver enzymes R74.8 790.5 [facility]      REFERRAL TO GASTROENTEROLOGY   2. Essential hypertension I10 401.9    3. Migraine with vertigo G43.109 346.00 diazePAM (VALIUM) 5 mg tablet     current treatment plan is effective, no change in therapy  lab results and schedule of future lab studies reviewed with patient  reviewed diet, exercise and weight control  reviewed medications and side effects in detail      I have discussed the diagnosis with the patient and the intended plan of care as seen in the above orders. The patient has received an after-visit summary and questions were answered concerning future plans. I have discussed medication, side effects, and warnings with the patient in detail. The patient verbalized understanding and is in agreement with the plan of care. The patient will contact the office with any additional concerns.     Adam Pelletier MD

## 2017-03-05 ENCOUNTER — TELEPHONE (OUTPATIENT)
Dept: FAMILY MEDICINE CLINIC | Age: 62
End: 2017-03-05

## 2017-03-05 RX ORDER — DIVALPROEX SODIUM 500 MG/1
1000 TABLET, DELAYED RELEASE ORAL
Qty: 30 TAB | Refills: 0 | Status: SHIPPED | OUTPATIENT
Start: 2017-03-05 | End: 2017-03-15 | Stop reason: ALTCHOICE

## 2017-03-05 NOTE — TELEPHONE ENCOUNTER
Pt called through to answering service. Pt states that she did not request refill on her Divalproex which she uses for her migraines when she saw her PCP last.  Pt states that she has used med as prescribed for Headache. She currently has had vertigo, HA and has felt shaky. [facility] on her way to moving to [place]. She is scheduled to see a neurologist on or around the 15th. THis provider called the [facility] and verified med and dosage. Temporary med prescribed for patient until PCP able to prescribe full rx. Pt voiced understanding.

## 2017-03-11 ENCOUNTER — HOSPITAL ENCOUNTER (OUTPATIENT)
Dept: ULTRASOUND IMAGING | Age: 62
Discharge: HOME | End: 2017-03-11
Attending: FAMILY MEDICINE
Payer: COMMERCIAL

## 2017-03-11 DIAGNOSIS — R74.8 ELEVATED LIVER ENZYMES: ICD-10-CM

## 2017-03-11 PROCEDURE — 76705 ECHO EXAM OF ABDOMEN: CPT

## 2017-03-15 ENCOUNTER — OFFICE VISIT (OUTPATIENT)
Dept: NEUROLOGY | Age: 62
End: 2017-03-15

## 2017-03-15 VITALS
WEIGHT: 211.4 LBS | BODY MASS INDEX: 32.04 KG/M2 | DIASTOLIC BLOOD PRESSURE: 98 MMHG | HEIGHT: 68 IN | OXYGEN SATURATION: 97 % | TEMPERATURE: 98 F | HEART RATE: 67 BPM | SYSTOLIC BLOOD PRESSURE: 144 MMHG

## 2017-03-15 DIAGNOSIS — G43.709 MIGRAINE, TRANSFORMED: Primary | ICD-10-CM

## 2017-03-15 DIAGNOSIS — I10 ESSENTIAL HYPERTENSION: ICD-10-CM

## 2017-03-15 DIAGNOSIS — E03.8 OTHER SPECIFIED HYPOTHYROIDISM: ICD-10-CM

## 2017-03-15 DIAGNOSIS — E61.0 COPPER DEFICIENCY: ICD-10-CM

## 2017-03-15 DIAGNOSIS — R40.4 TRANSIENT ALTERATION OF AWARENESS: ICD-10-CM

## 2017-03-15 DIAGNOSIS — R20.9 DISTURBANCE OF SKIN SENSATION: ICD-10-CM

## 2017-03-15 DIAGNOSIS — G43.109 MIGRAINE WITH VERTIGO: ICD-10-CM

## 2017-03-15 RX ORDER — DIVALPROEX SODIUM 500 MG/1
TABLET, EXTENDED RELEASE ORAL
Qty: 63 TAB | Refills: 1 | Status: SHIPPED | OUTPATIENT
Start: 2017-03-15 | End: 2017-04-24 | Stop reason: SDUPTHER

## 2017-03-15 NOTE — PROGRESS NOTES
[facility]             [address] [place], [address]    3/15/2017           Sarwat Arnold is a 62 y.o. female who comes in for evaluation of headaches. Garima Combs does have a headache at this time. Patient reports onset of significant headaches 4-5 years ago but since January they have been daily ranging from 3-10 in severity usually of 4-5 she does get dizzy initially spinning sensation as well as confusion difficulty speaking sometimes preceded by blurred vision headaches work initially relieved with Depakote but she missed medication and resuming him has not helped her headaches yet headaches are relieved with lying down \"quite place she has had an MRI but does not recall how long ago. She has significant back pain dizziness tremor with the headaches decreased sensation both legs as well as a history of scoliosis questionable neuropathy  Description of Headaches:  Location of pain: left-sided unilateral  Radiation of pain?:left-sided unilateral  Character of pain:unable to describe pain  Severity of pain: 3-10 out of 10. Accompanying symptoms: nausea, photophobia, scotomata, vertigo  Prodromal sx?: yes  Rapidity of onset: varies  Typical duration of individual headache: now constant  Are most headaches similar in presentation? yes  Typical precipitants: stress  Worst time of day: varies  Awakens from sleep with pain?: yes -     Current Use of Meds to Treat Headaches:  Abortive meds? sumatriptan PO  Daily use? no  Prophylactic meds? depakote    Additional Relevant History:  History of head/neck trauma?  no  Family h/o headache problems? no      Past Medical History:   Diagnosis Date    Headache     Hypertension     Scoliosis     Thyroid disease     Vertigo        Past Surgical History:   Procedure Laterality Date    HX BREAST AUGMENTATION  11/28/2006    HX CHOLECYSTECTOMY      HX COLONOSCOPY  08/2014    HX GASTRIC BYPASS 10/25/2001    HX HYSTERECTOMY  03/17/2003       Social History     Social History    Marital status:      Spouse name: N/A    Number of children: N/A    Years of education: N/A     Occupational History    Not on file. Social History Main Topics    Smoking status: Never Smoker    Smokeless tobacco: Never Used    Alcohol use 1.8 oz/week     3 Glasses of wine per week      Comment: daily wine 4 glasses    Drug use: No    Sexual activity: Yes     Partners: Male     Birth control/ protection: None     Other Topics Concern    Not on file     Social History Narrative       Review of Systems  A comprehensive review of systems was negative except for: Constitutional: positive for malaise  Musculoskeletal: positive for myalgias, arthralgias, neck pain and back pain  Neurological: positive for headaches, dizziness, vertigo, speech problems and tremor  Behvioral/Psych: positive for anxiety and depression    Physical Exam:    VITAL SIGNS:    Visit Vitals    BP (!) 144/98    Pulse 67    Temp 98 °F (36.7 °C) (Oral)    Ht 5' 8\" (1.727 m)    Wt 95.9 kg (211 lb 6.4 oz)    SpO2 97%    BMI 32.14 kg/m2       GENERAL: The patient is well developed, well nourished, and in no apparent distress. EXTREMITIES: No clubbing, cyanosis, or edema is identified. Pulses 2+  and symmetrical.    HEAD:   Ear, nose, and throat appear to be without trauma. The     patient is normocephalic. MUSCULOSKELETAL: Normal Posture. No point tenderness. NEUROLOGIC EXAMINATION    MENTAL STATUS: The patient is awake, alert, and oriented x 3. Speech is fluent and memory appears to be intact, both long and short term. No aphasias or apraxias. CRANIAL NERVES: II - Visual fields are full to confrontation. Funduscopic examination reveals flat disks bilaterally. Pupils are both equal and reactive to light and accommodation. III, IV, VI - Extraocular movements are intact and there is no nystagmus.    V - Facial sensation is intact to pinprick and light touch. VII - Face is symmetrical.   VIII - Hearing is present. IX, X - Palate rises symmetrically. Gag is present. Tongue is in the midline. MOTOR:  Tone is normal and symmetric. There is no pronator drift present. The strength is intact for all muscle groups tested in all four extremities. SENSORY:          Sensory examination is intact to pinprick, light touch and position sense testing. COORDINATION:      Finger to nose, heel to shin gait testing are normal as well as stress gait testing. REFLEXES:  2+ and symmetrical planters are down going    Assessment and Plan:  Rosario Fisher is a 62 y.o. right handed female whose history and physical are consistent with vascular headaches. Eliseo Combs who has risk factors including gastric bypass,not on dietary supplements, history vitamin b12 and copper deficiencies  Armando Marroquin was seen today for headache, dizziness and tremors. Diagnoses and all orders for this visit:    Migraine, transformed  -     MRI BRAIN W WO CONT; Future  -     EEG AWAKE AND ASLEEP; Future  -     COPPER; Future  -     VITAMIN B12; Future  -     VITAMIN B1, WHOLE BLOOD; Future  -     HEPATIC FUNCTION PANEL; Future  -     VALPROIC ACID; Future    Migraine with vertigo  -     MRI BRAIN W WO CONT; Future  -     EEG AWAKE AND ASLEEP; Future  -     COPPER; Future  -     VITAMIN B12; Future  -     VITAMIN B1, WHOLE BLOOD; Future  -     HEPATIC FUNCTION PANEL; Future  -     VALPROIC ACID; Future    Transient alteration of awareness  -     MRI BRAIN W WO CONT; Future  -     EEG AWAKE AND ASLEEP; Future  -     COPPER; Future  -     VITAMIN B12; Future  -     VITAMIN B1, WHOLE BLOOD; Future  -     HEPATIC FUNCTION PANEL; Future  -     VALPROIC ACID; Future    Other specified hypothyroidism  -     MRI BRAIN W WO CONT; Future  -     EEG AWAKE AND ASLEEP; Future  -     COPPER;  Future  -     VITAMIN B12; Future  -     VITAMIN B1, WHOLE BLOOD; Future  -     HEPATIC FUNCTION PANEL; Future  -     VALPROIC ACID; Future    Essential hypertension  -     MRI BRAIN W WO CONT; Future  -     EEG AWAKE AND ASLEEP; Future  -     COPPER; Future  -     VITAMIN B12; Future  -     VITAMIN B1, WHOLE BLOOD; Future  -     HEPATIC FUNCTION PANEL; Future  -     VALPROIC ACID; Future    Copper deficiency  -     MRI BRAIN W WO CONT; Future  -     EEG AWAKE AND ASLEEP; Future  -     COPPER; Future  -     VITAMIN B12; Future  -     VITAMIN B1, WHOLE BLOOD; Future  -     HEPATIC FUNCTION PANEL; Future  -     VALPROIC ACID; Future    Disturbance of skin sensation  -     MRI BRAIN W WO CONT; Future  -     EEG AWAKE AND ASLEEP; Future  -     COPPER; Future  -     VITAMIN B12; Future  -     VITAMIN B1, WHOLE BLOOD; Future  -     HEPATIC FUNCTION PANEL; Future  -     VALPROIC ACID; Future    Other orders  -     divalproex ER (DEPAKOTE ER) 500 mg ER tablet; 3 at bedtime tonight then 2 at bedtime      Follow-up Disposition:  Return in about 1 month (around 4/15/2017). Reviewed work up planned need for dietary supplements ,risks and benefits of depakote therapy  I spent 70 minutes with the patient in face-to-face consultation, of which greater than 50% was spent in counseling and coordination of care as described above.

## 2017-03-15 NOTE — MR AVS SNAPSHOT
Visit Information Date & Time Provider Department Dept. Phone Encounter #  
 3/15/2017 11:15 AM Mckayla De Leon  [facility] [address] [address] [id number] Follow-up Instructions Return in about 1 month (around 4/15/2017). Follow-up and Disposition History Your Appointments 3/28/2017  3:00 PM  
Follow Up with Berta Garcia MD  
[facility] App[facility] Note: f/u for migraine, elevated liver enzymes [address]  
  
   
 [address] [facility]  
  
    
 4/25/2017  1:00 PM  
Follow Up with Mckayla De Leon MD  
[facility]) Appt Note: 1mon f/u; EEG, MRI & Labs [address] [facility] [id number] [phone]  
  
   
 [facility] [id number] Upcoming Health Maintenance Date Due  
 PAP AKA CERVICAL CYTOLOGY 11/6/1977 ZOSTER VACCINE AGE 60> 11/6/2016 FOBT Q 1 YEAR AGE 50-75 2/22/2018 BREAST CANCER SCRN MAMMOGRAM 6/28/2018 DTaP/Tdap/Td series (3 - Td) 2/21/2027 Allergies as of 3/15/2017  Review Complete On: 3/15/2017 By: Mckayla De Leon MD  
  
 Severity Noted Reaction Type Reactions Other Medication High 03/15/2017    Anaphylaxis ANTI INFLAMATORY Sulfa (Sulfonamide Antibiotics)  01/26/2017    Rash Current Immunizations  Reviewed on 2/21/2017 Name Date Tdap 2/21/2017 Not reviewed this visit You Were Diagnosed With   
  
 Codes Comments Migraine, transformed    -  Primary ICD-10-CM: N39.556 ICD-9-CM: 346.70 Migraine with vertigo     ICD-10-CM: G43.109 ICD-9-CM: 346.00 Transient alteration of awareness     ICD-10-CM: R40.4 ICD-9-CM: 780.02 Other specified hypothyroidism     ICD-10-CM: E03.8 ICD-9-CM: 244.8 Essential hypertension     ICD-10-CM: I10 
ICD-9-CM: 401.9 Copper deficiency     ICD-10-CM: E61.0 ICD-9-CM: 275.1 Disturbance of skin sensation     ICD-10-CM: R20.9 ICD-9-CM: 728. 0 Vitals BP Pulse Temp Height(growth percentile) Weight(growth percentile) SpO2  
 (!) 144/98 67 98 °F (36.7 °C) (Oral) 5' 8\" (1.727 m) 211 lb 6.4 oz (95.9 kg) 97% BMI OB Status Smoking Status 32.14 kg/m2 Hysterectomy Never Smoker BMI and BSA Data Body Mass Index Body Surface Area  
 32.14 kg/m 2 2.15 m 2 Preferred Pharmacy Pharmacy Name Phone [facility] [address], [address] [facility] Your Updated Medication List  
  
   
This list is accurate as of: 3/15/17 12:22 PM.  Always use your most recent med list.  
  
  
  
  
 BENICAR 20 mg tablet Generic drug:  olmesartan Take 1 Tab by mouth daily. cyclobenzaprine 10 mg tablet Commonly known as:  FLEXERIL Take  by mouth three (3) times daily as needed for Muscle Spasm(s). diazePAM 5 mg tablet Commonly known as:  VALIUM Take 1 Tab by mouth daily as needed for Anxiety (vertigo). divalproex  mg ER tablet Commonly known as:  DEPAKOTE ER  
3 at bedtime tonight then 2 at bedtime DYMISTA 137-50 mcg/spray Ola Generic drug:  azelastine-fluticasone  
by Nasal route. EVAMIST 1.53 mg/spray (1.7%) Spry Generic drug:  Estradiol  
by TransDERmal route.  
  
 gabapentin 100 mg capsule Commonly known as:  NEURONTIN Take 2 Caps by mouth three (3) times daily. HYDROcodone-acetaminophen 7.5-500 mg per tablet Commonly known as:  Katey Burows Take  by mouth every four (4) hours as needed for Pain.  
  
 levothyroxine 50 mcg tablet Commonly known as:  SYNTHROID Take  by mouth Daily (before breakfast). methocarbamol 750 mg tablet Commonly known as:  ROBAXIN Take 1 Tab by mouth three (3) times daily. Omega-3-DHA-EPA-Fish Oil 1,000 mg (120 mg-180 mg) Cap Take  by mouth. venlafaxine-SR 75 mg capsule Commonly known as:  EFFEXOR XR Take 1 Cap by mouth daily. VITAMIN D3 1,000 unit Cap Generic drug:  cholecalciferol Take  by mouth daily. WOMEN'S DAILY MULTIVITAMIN PO Take  by mouth. Prescriptions Sent to Pharmacy Refills  
 divalproex ER (DEPAKOTE ER) 500 mg ER tablet 1 Sig: 3 at bedtime tonight then 2 at bedtime Class: Normal  
 Pharmacy: [id number] Ph #: [phone] Follow-up Instructions Return in about 1 month (around 4/15/2017). To-Do List   
 03/15/2017 Lab:  COPPER   
  
 03/15/2017 Neurology:  EEG AWAKE AND ASLEEP   
  
 03/15/2017 Lab:  HEPATIC FUNCTION PANEL   
  
 03/15/2017 Imaging:  MRI BRAIN W WO CONT   
  
 03/15/2017 Lab:  VALPROIC ACID   
  
 03/15/2017 Lab:  VITAMIN B1, WHOLE BLOOD   
  
 03/15/2017 Lab:  VITAMIN B12 Introducing [facility]! Lety Johnson introduces [employer] patient portal. Now you can access parts of your medical record, email your doctor's office, and request medication refills online. 1. In your internet browser, go to [email or web address] 2. Click on the First Time User? Click Here link in the Sign In box. You will see the New Member Sign Up page. 3. Enter your Go[employer] Access Code exactly as it appears below. You will not need to use this code after youve completed the sign-up process. If you do not sign up before the expiration date, you must request a new code. · Go[employer] Access Code: [email or web address] Expires: 4/26/2017  2:35 PM 
 
4. Enter the last four digits of your Social Security Number (xxxx) and Date of Birth (mm/dd/yyyy) as indicated and click Submit. You will be taken to the next sign-up page. 5. Create a GoHome ID. This will be your GoHome login ID and cannot be changed, so think of one that is secure and easy to remember. 6. Create a [employer] password. You can change your password at any time. 7. Enter your Password Reset Question and Answer. This can be used at a later time if you forget your password. 8. Enter your e-mail address. You will receive e-mail notification when new information is available in [address] 9. Click Sign Up. You can now view and download portions of your medical record. 10. Click the Download Summary menu link to download a portable copy of your medical information. If you have questions, please visit the Frequently Asked Questions section of the [employer] website. Remember, [employer] is NOT to be used for urgent needs. For medical emergencies, dial 911. Now available from your iPhone and Android! Please provide this summary of care documentation to your next provider. Your primary care clinician is listed as Berta Rivera. If you have any questions after today's visit, please call [phone].

## 2017-03-15 NOTE — PROGRESS NOTES
Reviewed chart and medications with the patients and patients . Unable to do orthostatic BP's due to patients shaking/tremors.

## 2017-03-17 ENCOUNTER — TELEPHONE (OUTPATIENT)
Dept: FAMILY MEDICINE CLINIC | Age: 62
End: 2017-03-17

## 2017-03-17 NOTE — PROGRESS NOTES
Please let patient know result shows right renal cyst. She also has normal size liver with features suggestive of fatty liver. She should f/u with the gastroenterologist as scheduled.   Asael Jonas MD

## 2017-03-17 NOTE — PROGRESS NOTES
Informed patient her US results show right renal cyst. She has a normal size liver with features suggestive of fatty liver. She should follow up with the gastroenterologist as scheduled. Patient verbalized understanding.

## 2017-03-17 NOTE — TELEPHONE ENCOUNTER
Patient is asking the result of her imaging that was done on 3/11/17. I informed the patient that Dr. Oriana Davalos haven't reviewed the result yet that the nurse will call her back once her result is finalized. Please assist the patient.

## 2017-03-22 ENCOUNTER — HOSPITAL ENCOUNTER (OUTPATIENT)
Dept: LAB | Age: 62
End: 2017-03-22
Attending: PSYCHIATRY & NEUROLOGY
Payer: COMMERCIAL

## 2017-03-22 ENCOUNTER — HOSPITAL ENCOUNTER (OUTPATIENT)
Age: 62
Discharge: HOME | End: 2017-03-22
Attending: PSYCHIATRY & NEUROLOGY
Payer: COMMERCIAL

## 2017-03-22 DIAGNOSIS — R20.9 DISTURBANCE OF SKIN SENSATION: ICD-10-CM

## 2017-03-22 DIAGNOSIS — G43.109 MIGRAINE WITH VERTIGO: ICD-10-CM

## 2017-03-22 DIAGNOSIS — G43.709 MIGRAINE, TRANSFORMED: ICD-10-CM

## 2017-03-22 DIAGNOSIS — E61.0 COPPER DEFICIENCY: ICD-10-CM

## 2017-03-22 DIAGNOSIS — I10 ESSENTIAL HYPERTENSION: ICD-10-CM

## 2017-03-22 DIAGNOSIS — R40.4 TRANSIENT ALTERATION OF AWARENESS: ICD-10-CM

## 2017-03-22 DIAGNOSIS — E03.8 OTHER SPECIFIED HYPOTHYROIDISM: ICD-10-CM

## 2017-03-22 LAB — CREAT UR-MCNC: 0.6 MG/DL (ref 0.6–1.3)

## 2017-03-22 PROCEDURE — 74011250636 HC RX REV CODE- 250/636: Performed by: PSYCHIATRY & NEUROLOGY

## 2017-03-22 PROCEDURE — 70553 MRI BRAIN STEM W/O & W/DYE: CPT

## 2017-03-22 PROCEDURE — 82565 ASSAY OF CREATININE: CPT

## 2017-03-22 PROCEDURE — A9585 GADOBUTROL INJECTION: HCPCS | Performed by: PSYCHIATRY & NEUROLOGY

## 2017-03-22 RX ADMIN — GADOBUTROL 10 ML: 604.72 INJECTION INTRAVENOUS at 17:00

## 2017-03-23 ENCOUNTER — TELEPHONE (OUTPATIENT)
Dept: FAMILY MEDICINE CLINIC | Age: 62
End: 2017-03-23

## 2017-03-23 NOTE — TELEPHONE ENCOUNTER
[address], the lady I spoke with said they haven't sent out Ms. Garrets information packet yet. They should send it out sometime next week. MsOlya Chidi Gardner was informed of this, was told to call us back if she doesn't receive it by the end of next week.

## 2017-03-23 NOTE — TELEPHONE ENCOUNTER
Ms. Erma Milton called this afternoon stating that someone called her from pain management early in the morning saying they would send her a packet in the mail. She couldn't recall the place that called her and she hasn't received the packet. I let the patient know we referred her to [facility]. I told her I would contact them to see what was going on and would let her know.

## 2017-04-24 ENCOUNTER — HOSPITAL ENCOUNTER (OUTPATIENT)
Dept: NEUROLOGY | Age: 62
Discharge: HOME | End: 2017-04-24
Attending: PSYCHIATRY & NEUROLOGY
Payer: COMMERCIAL

## 2017-04-24 ENCOUNTER — OFFICE VISIT (OUTPATIENT)
Dept: NEUROLOGY | Age: 62
End: 2017-04-24

## 2017-04-24 ENCOUNTER — HOSPITAL ENCOUNTER (OUTPATIENT)
Dept: LAB | Age: 62
Discharge: HOME | End: 2017-04-24
Payer: COMMERCIAL

## 2017-04-24 VITALS
BODY MASS INDEX: 32.34 KG/M2 | HEART RATE: 80 BPM | DIASTOLIC BLOOD PRESSURE: 82 MMHG | HEIGHT: 68 IN | WEIGHT: 213.4 LBS | OXYGEN SATURATION: 98 % | TEMPERATURE: 98.7 F | SYSTOLIC BLOOD PRESSURE: 130 MMHG

## 2017-04-24 DIAGNOSIS — I10 ESSENTIAL HYPERTENSION: ICD-10-CM

## 2017-04-24 DIAGNOSIS — G43.709 MIGRAINE, TRANSFORMED: Primary | ICD-10-CM

## 2017-04-24 DIAGNOSIS — R40.4 TRANSIENT ALTERATION OF AWARENESS: ICD-10-CM

## 2017-04-24 DIAGNOSIS — R20.9 DISTURBANCE OF SKIN SENSATION: ICD-10-CM

## 2017-04-24 DIAGNOSIS — G43.109 MIGRAINE WITH VERTIGO: ICD-10-CM

## 2017-04-24 DIAGNOSIS — G43.709 MIGRAINE, TRANSFORMED: ICD-10-CM

## 2017-04-24 DIAGNOSIS — E61.0 COPPER DEFICIENCY: ICD-10-CM

## 2017-04-24 DIAGNOSIS — E03.8 OTHER SPECIFIED HYPOTHYROIDISM: ICD-10-CM

## 2017-04-24 DIAGNOSIS — F32.89 OTHER DEPRESSION: ICD-10-CM

## 2017-04-24 DIAGNOSIS — R89.9 ABNORMAL LABORATORY TEST RESULT: ICD-10-CM

## 2017-04-24 LAB
ALBUMIN SERPL BCP-MCNC: 3.6 G/DL (ref 3.4–5)
ALBUMIN/GLOB SERPL: 1.1 {RATIO} (ref 0.8–1.7)
ALP SERPL-CCNC: 104 U/L (ref 45–117)
ALT SERPL-CCNC: 84 U/L (ref 13–56)
AST SERPL W P-5'-P-CCNC: 102 U/L (ref 15–37)
BILIRUB DIRECT SERPL-MCNC: 0.2 MG/DL (ref 0–0.2)
BILIRUB SERPL-MCNC: 0.5 MG/DL (ref 0.2–1)
GLOBULIN SER CALC-MCNC: 3.3 G/DL (ref 2–4)
PROT SERPL-MCNC: 6.9 G/DL (ref 6.4–8.2)
VALPROATE SERPL-MCNC: 32 UG/ML (ref 50–100)
VIT B12 SERPL-MCNC: 751 PG/ML (ref 211–911)

## 2017-04-24 PROCEDURE — 95819 EEG AWAKE AND ASLEEP: CPT

## 2017-04-24 RX ORDER — LANOLIN ALCOHOL/MO/W.PET/CERES
CREAM (GRAM) TOPICAL DAILY
COMMUNITY

## 2017-04-24 RX ORDER — VENLAFAXINE HYDROCHLORIDE 150 MG/1
CAPSULE, EXTENDED RELEASE ORAL
Qty: 30 CAP | Refills: 2 | Status: SHIPPED | OUTPATIENT
Start: 2017-04-24 | End: 2018-01-16 | Stop reason: SDUPTHER

## 2017-04-24 RX ORDER — DIVALPROEX SODIUM 500 MG/1
TABLET, EXTENDED RELEASE ORAL
Qty: 90 TAB | Refills: 2 | Status: SHIPPED | OUTPATIENT
Start: 2017-04-24 | End: 2017-08-15 | Stop reason: SDUPTHER

## 2017-04-24 RX ORDER — LANOLIN ALCOHOL/MO/W.PET/CERES
400 CREAM (GRAM) TOPICAL DAILY
COMMUNITY

## 2017-04-24 RX ORDER — TOPIRAMATE 25 MG/1
TABLET ORAL
Qty: 120 TAB | Refills: 2 | Status: SHIPPED | OUTPATIENT
Start: 2017-04-24 | End: 2017-06-14 | Stop reason: SDUPTHER

## 2017-04-24 NOTE — PROGRESS NOTES
[facility]             [address] [place], [address]    4/24/2017           Ivanna Fraga is a 62 y.o. female who comes in for evaluation of headaches. Virgilio Combs does have a headache at this time. Patient reports onset of significant headaches 4-5 years ago but since January they have been daily ranging from 3-10 in severity usually of 4-5 she does get dizzy initially spinning sensation as well as confusion difficulty speaking sometimes preceded by blurred vision headaches work initially relieved with Depakote but she missed medication and resuming him has not helped her headaches yet headaches are relieved with lying down \"quite place she has had an MRI but does not recall how long ago. She has significant back pain dizziness tremor with the headaches decreased sensation both legs as well as a history of scoliosis questionable neuropathy    Patient returns today reporting no improvement in headaches. She has a severe headache at this time she had a EEG with the severe headache and therefore did not have photic stimulation or hyperventilation. EEG has been now reviewed as normal.  MRI of brain was also reviewed is normal personally reviewed imaging. Laboratory evaluation was just done with the Depakote level subtherapeutic and slightly elevated liver functions. Patient reports liver functions have been elevated in the past and is followed by hepatologist She reports increased depression  Description of Headaches:  Location of pain: left-sided unilateral  Radiation of pain?:left-sided unilateral  Character of pain:unable to describe pain  Severity of pain: 3-10 out of 10. Accompanying symptoms: nausea, photophobia, scotomata, vertigo  Prodromal sx?: yes  Rapidity of onset: varies  Typical duration of individual headache: now constant  Are most headaches similar in presentation?  yes  Typical precipitants: stress  Worst time of day: varies  Awakens from sleep with pain?: yes -     Current Use of Meds to Treat Headaches:  Abortive meds? sumatriptan PO  Daily use? no  Prophylactic meds? depakote    Additional Relevant History:  History of head/neck trauma? no  Family h/o headache problems? no      Past Medical History:   Diagnosis Date    Headache     Hypertension     Scoliosis     Thyroid disease     Vertigo        Past Surgical History:   Procedure Laterality Date    HX BREAST AUGMENTATION  11/28/2006    HX CHOLECYSTECTOMY      HX COLONOSCOPY  08/2014    HX GASTRIC BYPASS  10/25/2001    HX HYSTERECTOMY  03/17/2003       Social History     Social History    Marital status:      Spouse name: N/A    Number of children: N/A    Years of education: N/A     Occupational History    Not on file. Social History Main Topics    Smoking status: Never Smoker    Smokeless tobacco: Never Used    Alcohol use 1.8 oz/week     3 Glasses of wine per week      Comment: daily wine 4 glasses    Drug use: No    Sexual activity: Yes     Partners: Male     Birth control/ protection: None     Other Topics Concern    Not on file     Social History Narrative       Review of Systems  A comprehensive review of systems was negative except for: Constitutional: positive for malaise  Musculoskeletal: positive for myalgias, arthralgias, neck pain and back pain  Neurological: positive for headaches, dizziness, vertigo, speech problems and tremor  Behvioral/Psych: positive for anxiety and depression    Physical Exam:    VITAL SIGNS:    Visit Vitals    /82    Pulse 80    Temp 98.7 °F (37.1 °C) (Oral)    Ht 5' 8\" (1.727 m)    Wt 96.8 kg (213 lb 6.4 oz)    SpO2 98%    BMI 32.45 kg/m2       GENERAL: The patient is well developed, well nourished, and in milddistress. EXTREMITIES: No clubbing, cyanosis, or edema is identified. Pulses 2+  and symmetrical.    HEAD:   Ear, nose, and throat appear to be without trauma.   The     patient is normocephalic. MUSCULOSKELETAL: Normal Posture. No point tenderness. NEUROLOGIC EXAMINATION    MENTAL STATUS: The patient is awake, alert, and oriented x 3. Speech is fluent and memory appears to be intact, both long and short term. No aphasias or apraxias. CRANIAL NERVES: II - Visual fields are full to confrontation Pupils are both equal and reactive to light and accommodation. III, IV, VI - Extraocular movements are intact and there is no nystagmus. V - Facial sensation is intact to pinprick and light touch. VII - Face is symmetrical.   VIII - Hearing is present. MOTOR:  Tone is normal and symmetric. There is no pronator drift present. The strength is intact for all muscle Sgroups tested in all four extremities. COORDINATION:     gait testingnormal     eeg normal , mri brain imaging personally reviewed as normal     Prelim lab reviewed    Procedure: MRI of the brain with and without contrast.     CPT code: 33758     Comparisons: None. Indications: vertigo, cognitive dysfunction,     Technique: Brain scanned with axial and sagittal T1W scans, axial T2W scans,  axial diffusion weighted images, and post gadolinium axial and coronal T1W  scans. cc intravenous Magnevist was administered for this exam.     Findings:        1. Diffusion weighted images are without evidence for acute ischemia. No  evidence for old lacunar or other infarct. 2.  No abnormal area of increased signal noted within the brain parenchyma on  long TE images. 3.  No abnormal areas of contrast enhancement. 4.  There is no evidence for mass, mass effect, or midline shift. 5.  No intra- or extra-axial fluid collections are identified. 6.  The ventricles, sulci, and cisterns are unremarkable for age. 7.  The midline structures of the brain to include the pituitary gland (empty  sella is a normal variant), corpus callosum, and brainstem structures are normal  appearing.     8.  Normal major vessel flow voids are present. Dural sinuses are patent. 9.  No Chiari malformation noted. 10. The leptomeninges are unremarkable. 11. The extra-axial craniofacial structures incidentally imaged are normal.        IMPRESSION  Impression:     Essentially unremarkable MRI of the brain with and without contrast.    Assessment and Plan:  Ruthy Garcia is a 62 y.o. right handed female whose history and physical are consistent with vascular headaches. Tiffany Combs who has risk factors including gastric bypass,not on dietary supplements, history vitamin b12 and copper deficiencies  Elkin Mohr was seen today for headache. Diagnoses and all orders for this visit:    Migraine, transformed    Migraine with vertigo    Transient alteration of awareness    Abnormal laboratory test result    Other depression    Other orders  -     venlafaxine-SR (EFFEXOR-XR) 150 mg capsule; 1 cap daily  -     divalproex ER (DEPAKOTE ER) 500 mg ER tablet; 1 tab today then 3 at bedtime nightly  -     topiramate (TOPAMAX) 25 mg tablet; 1 q hs x 4 days then 1 twice  A day for 7 days then 2 twice a day      Follow-up Disposition:  Return in about 1 month (around 5/24/2017). Reviewed work up planned need for dietary supplements ,risks and benefits of depakote therapy  I spent 40 minutes with the patient in face-to-face consultation, of which greater than 50% was spent in counseling and coordination of care as described above.

## 2017-04-24 NOTE — MR AVS SNAPSHOT
Visit Information Date & Time Provider Department Dept. Phone Encounter #  
 4/24/2017  3:00 PM Jaylen Lawrence  [facility] [address] [address] [id number] Follow-up Instructions Return in about 1 month (around 5/24/2017). Follow-up and Disposition History Your Appointments 6/14/2017  4:00 PM  
Follow Up with Jaylen Lawrence MD  
[facility] Note: 1mon f/u; med ck [address] [facility] [id number] [phone]  
  
   
 [facility] [id number] Upcoming Health Maintenance Date Due  
 PAP AKA CERVICAL CYTOLOGY 11/6/1977 ZOSTER VACCINE AGE 60> 11/6/2016 FOBT Q 1 YEAR AGE 50-75 2/22/2018 BREAST CANCER SCRN MAMMOGRAM 6/28/2018 DTaP/Tdap/Td series (3 - Td) 2/21/2027 Allergies as of 4/24/2017  Review Complete On: 4/24/2017 By: Jaylen Lawrence MD  
  
 Severity Noted Reaction Type Reactions Other Medication High 03/15/2017    Anaphylaxis ANTI INFLAMATORY Sulfa (Sulfonamide Antibiotics)  01/26/2017    Rash Current Immunizations  Reviewed on 2/21/2017 Name Date Tdap 2/21/2017 Not reviewed this visit You Were Diagnosed With   
  
 Codes Comments Migraine, transformed    -  Primary ICD-10-CM: N12.221 ICD-9-CM: 346.70 Migraine with vertigo     ICD-10-CM: G43.109 ICD-9-CM: 346.00 Transient alteration of awareness     ICD-10-CM: R40.4 ICD-9-CM: 780.02 Abnormal laboratory test result     ICD-10-CM: R89.9 ICD-9-CM: 796.4 Other depression     ICD-10-CM: F32.89 ICD-9-CM: 203 Vitals BP Pulse Temp Height(growth percentile) Weight(growth percentile) SpO2  
 130/82 80 98.7 °F (37.1 °C) (Oral) 5' 8\" (1.727 m) 213 lb 6.4 oz (96.8 kg) 98% BMI OB Status Smoking Status 32.45 kg/m2 Hysterectomy Never Smoker BMI and BSA Data Body Mass Index Body Surface Area 32.45 kg/m 2 2.16 m 2 Preferred Pharmacy [facility] [address], [address] [facility] Your Updated Medication List  
  
   
This list is accurate as of: 4/24/17  4:05 PM.  Always use your most recent med list.  
  
  
  
  
 BENICAR 20 mg tablet Generic drug:  olmesartan Take 1 Tab by mouth daily. cyclobenzaprine 10 mg tablet Commonly known as:  FLEXERIL Take  by mouth three (3) times daily as needed for Muscle Spasm(s). diazePAM 5 mg tablet Commonly known as:  VALIUM Take 1 Tab by mouth daily as needed for Anxiety (vertigo). divalproex  mg ER tablet Commonly known as:  DEPAKOTE ER  
1 tab today then 3 at bedtime nightly DYMISTA 137-50 mcg/spray [place] Generic drug:  azelastine-fluticasone  
by Nasal route. EVAMIST 1.53 mg/spray (1.7%) [place] Generic drug:  Estradiol  
by TransDERmal route.  
  
 gabapentin 100 mg capsule Commonly known as:  NEURONTIN Take 2 Caps by mouth three (3) times daily. HYDROcodone-acetaminophen 7.5-500 mg per tablet Commonly known as:  Wendi Narrow Take  by mouth every four (4) hours as needed for Pain.  
  
 levothyroxine 50 mcg tablet Commonly known as:  SYNTHROID Take  by mouth Daily (before breakfast). magnesium oxide 400 mg tablet Commonly known as:  MAG-OX Take 400 mg by mouth daily. methocarbamol 750 mg tablet Commonly known as:  ROBAXIN Take 1 Tab by mouth three (3) times daily. Omega-3-DHA-EPA-Fish Oil 1,000 mg (120 mg-180 mg) Cap Take  by mouth. topiramate 25 mg tablet Commonly known as:  TOPAMAX  
1 q hs x 4 days then 1 twice  A day for 7 days then 2 twice a day  
  
 venlafaxine- mg capsule Commonly known as:  EFFEXOR XR  
1 cap daily VITAMIN B-1 100 mg tablet Generic drug:  thiamine Take  by mouth daily. VITAMIN B-12 1,000 mcg/mL Drop Generic drug:  cyanocobalamin (vitamin B-12) Take  by mouth. VITAMIN D3 1,000 unit Cap Generic drug:  cholecalciferol Take  by mouth daily. WOMEN'S DAILY MULTIVITAMIN PO Take  by mouth. Prescriptions Sent to Pharmacy Refills  
 venlafaxine-SR (EFFEXOR-XR) 150 mg capsule 2 Si cap daily Class: Normal  
 Pharmacy: [facility] [address] Ph #: [phone]  
 divalproex ER (DEPAKOTE ER) 500 mg ER tablet 2 Si tab today then 3 at bedtime nightly Class: Normal  
 Pharmacy: [id number] Ph #: [phone]  
 topiramate (TOPAMAX) 25 mg tablet 2 Si q hs x 4 days then 1 twice  A day for 7 days then 2 twice a day Class: Normal  
 Pharmacy: [facility] [address] Ph #: [phone] Follow-up Instructions Return in about 1 month (around 2017). Please provide this summary of care documentation to your next provider. Your primary care clinician is listed as Berta Rivera. If you have any questions after today's visit, please call [phone].

## 2017-04-25 NOTE — PROCEDURES
[place]    Name:  Jessica Padilla  MR#:  [id number]  :  1955  Account #:  [de-identified]  Date of Adm:  2017  Date of Service:  2017      REFERRING/INTERPRETING PHYSICIAN: Paul Parker. Tamara Mcfarlane MD    This is a 72-year-old, right-handed female who presents for  electrocortical evaluation of headache appreciated by vertigo with  associated confusion. MEDICATIONS: Include  1. Depakote. 2. Benicar. 3. Valium. 4. Synthroid. 5. Evamist.  6. Lortab. 7. Zestril. 8. Neurontin. 9. Effexor. 10. Robaxin. EEG examination is performed as an outpatient utilizing both referential  and differential montages as well as International 10/20 electrode  placement system. The patient does note the headache during  examination. She demonstrates a well-formed posteriorly dominant and  reactive alpha rhythm up to 10 Hz. Photic stimulation and  hyperventilation were deferred since the patient had an active  headache during the examination. Mental alerting failed to elicit  abnormal activities. There are no focal lateralized or  abnormal paroxysmal discharges on single channel EKG monitoring. No cardiac ectopy. ELECTROENCEPHALOGRAM INTERPRETATION: Normal awake  recording.         MD DANI Red / Camelia Fuller  D:  2017   14:52  T:  2017   20:34  Job #:  [id number]

## 2017-04-26 LAB
COPPER SERPL-MCNC: 95 UG/DL (ref 72–166)
VIT B1 BLD-SCNC: 149.3 NMOL/L (ref 66.5–200)

## 2017-06-05 RX ORDER — GABAPENTIN 100 MG/1
CAPSULE ORAL
Qty: 540 CAP | Refills: 0 | Status: SHIPPED | OUTPATIENT
Start: 2017-06-05 | End: 2018-01-16 | Stop reason: SDUPTHER

## 2017-06-05 RX ORDER — METHOCARBAMOL 750 MG/1
TABLET, FILM COATED ORAL
Qty: 270 TAB | Refills: 0 | Status: SHIPPED | OUTPATIENT
Start: 2017-06-05 | End: 2018-01-31 | Stop reason: SDUPTHER

## 2017-06-05 RX ORDER — VENLAFAXINE HYDROCHLORIDE 75 MG/1
CAPSULE, EXTENDED RELEASE ORAL
Qty: 90 CAP | Refills: 0 | Status: SHIPPED | OUTPATIENT
Start: 2017-06-05 | End: 2017-08-15

## 2017-06-14 ENCOUNTER — OFFICE VISIT (OUTPATIENT)
Dept: NEUROLOGY | Age: 62
End: 2017-06-14

## 2017-06-14 VITALS
HEART RATE: 66 BPM | DIASTOLIC BLOOD PRESSURE: 60 MMHG | SYSTOLIC BLOOD PRESSURE: 130 MMHG | TEMPERATURE: 98.2 F | BODY MASS INDEX: 30.83 KG/M2 | OXYGEN SATURATION: 98 % | HEIGHT: 68 IN | WEIGHT: 203.4 LBS

## 2017-06-14 DIAGNOSIS — R89.9 ABNORMAL LABORATORY TEST RESULT: ICD-10-CM

## 2017-06-14 DIAGNOSIS — G43.109 MIGRAINE WITH VERTIGO: ICD-10-CM

## 2017-06-14 DIAGNOSIS — G43.709 MIGRAINE, TRANSFORMED: Primary | ICD-10-CM

## 2017-06-14 DIAGNOSIS — F32.89 OTHER DEPRESSION: ICD-10-CM

## 2017-06-14 DIAGNOSIS — R40.4 TRANSIENT ALTERATION OF AWARENESS: ICD-10-CM

## 2017-06-14 RX ORDER — TOPIRAMATE 25 MG/1
TABLET ORAL
Qty: 150 TAB | Refills: 2 | Status: SHIPPED | OUTPATIENT
Start: 2017-06-14 | End: 2017-08-15 | Stop reason: SDUPTHER

## 2017-06-14 NOTE — MR AVS SNAPSHOT
Visit Information Date & Time Provider Department Dept. Phone Encounter #  
 6/14/2017  4:00 PM Horacio Thomas MD [address] [place] [address] [address] [address] [id number] Follow-up Instructions Return in about 2 months (around 8/14/2017). Follow-up and Disposition History Upcoming Health Maintenance Date Due  
 PAP AKA CERVICAL CYTOLOGY 11/6/1977 ZOSTER VACCINE AGE 60> 11/6/2016 INFLUENZA AGE 9 TO ADULT 8/1/2017 FOBT Q 1 YEAR AGE 50-75 2/22/2018 BREAST CANCER SCRN MAMMOGRAM 6/28/2018 DTaP/Tdap/Td series (3 - Td) 2/21/2027 Allergies as of 6/14/2017  Review Complete On: 6/14/2017 By: Horacio Thomas MD  
  
 Severity Noted Reaction Type Reactions Other Medication High 03/15/2017    Anaphylaxis ANTI INFLAMATORY Sulfa (Sulfonamide Antibiotics)  01/26/2017    Rash Current Immunizations  Reviewed on 2/21/2017 Name Date Tdap 2/21/2017 Not reviewed this visit You Were Diagnosed With   
  
 Codes Comments Migraine, transformed    -  Primary ICD-10-CM: X73.277 ICD-9-CM: 346.70 Migraine with vertigo     ICD-10-CM: G43.109 ICD-9-CM: 346.00 Transient alteration of awareness     ICD-10-CM: R40.4 ICD-9-CM: 780.02 Abnormal laboratory test result     ICD-10-CM: R89.9 ICD-9-CM: 796.4 Other depression     ICD-10-CM: F32.89 ICD-9-CM: 246 Vitals BP Pulse Temp Height(growth percentile) Weight(growth percentile) SpO2  
 130/60 66 98.2 °F (36.8 °C) (Oral) 5' 8\" (1.727 m) 203 lb 6.4 oz (92.3 kg) 98% BMI OB Status Smoking Status 30.93 kg/m2 Hysterectomy Never Smoker BMI and BSA Data Body Mass Index Body Surface Area 30.93 kg/m 2 2.1 m 2 Preferred Pharmacy Pharmacy Name Phone [facility] [address] [facility] [phone] Your Updated Medication List  
  
   
This list is accurate as of: 6/14/17  4:17 PM.  Always use your most recent med list.  
  
  
  
  
 BENICAR 20 mg tablet Generic drug:  olmesartan Take 1 Tab by mouth daily. cyclobenzaprine 10 mg tablet Commonly known as:  FLEXERIL Take  by mouth three (3) times daily as needed for Muscle Spasm(s). diazePAM 5 mg tablet Commonly known as:  VALIUM Take 1 Tab by mouth daily as needed for Anxiety (vertigo). divalproex  mg ER tablet Commonly known as:  DEPAKOTE ER  
1 tab today then 3 at bedtime nightly DYMISTA 137-50 mcg/spray [place] Generic drug:  azelastine-fluticasone  
by Nasal route. EVAMIST 1.53 mg/spray (1.7%) [place] Generic drug:  Estradiol  
by TransDERmal route.  
  
 gabapentin 100 mg capsule Commonly known as:  NEURONTIN  
TAKE 2 BY MOUTH THREE TIMES DAILY HYDROcodone-acetaminophen 7.5-500 mg per tablet Commonly known as:  [address] Take  by mouth every four (4) hours as needed for Pain.  
  
 levothyroxine 50 mcg tablet Commonly known as:  SYNTHROID Take  by mouth Daily (before breakfast). magnesium oxide 400 mg tablet Commonly known as:  MAG-OX Take 400 mg by mouth daily. methocarbamol 750 mg tablet Commonly known as:  ROBAXIN  
TAKE 1 BY MOUTH 3 TIMES DAILY Omega-3-DHA-EPA-Fish Oil 1,000 mg (120 mg-180 mg) Cap Take  by mouth. topiramate 25 mg tablet Commonly known as:  TOPAMAX  
2  q am 3 qhs  
  
 * venlafaxine- mg capsule Commonly known as:  EFFEXOR XR  
1 cap daily * venlafaxine-SR 75 mg capsule Commonly known as:  EFFEXOR-XR  
TAKE 1 BY MOUTH DAILY  
  
 VITAMIN B-1 100 mg tablet Generic drug:  thiamine Take  by mouth daily. VITAMIN B-12 1,000 mcg/mL Drop Generic drug:  cyanocobalamin (vitamin B-12) Take  by mouth. VITAMIN D3 1,000 unit Cap Generic drug:  cholecalciferol Take  by mouth daily. WOMEN'S DAILY MULTIVITAMIN PO Take  by mouth. * Notice:   This list has 2 medication(s) that are the same as other medications prescribed for you. Read the directions carefully, and ask your doctor or other care provider to review them with you. Prescriptions Sent to Pharmacy Refills  
 topiramate (TOPAMAX) 25 mg tablet 2 Si  q am 3 qhs  
 Class: Normal  
 Pharmacy: [facility] [place] - [address] [facility] #: [phone] Follow-up Instructions Return in about 2 months (around 2017). To-Do List   
 2017 Lab:  HEPATIC FUNCTION PANEL   
  
 2017 Lab:  METABOLIC PANEL, COMPREHENSIVE   
  
 2017 Lab:  VALPROIC ACID Please provide this summary of care documentation to your next provider. Your primary care clinician is listed as Berta Rivera. If you have any questions after today's visit, please call [phone].

## 2017-06-14 NOTE — PROGRESS NOTES
[facility]             [address] [facility], [address] [place] [address]                [facility], [address]    6/14/2017           Cheyenne Carpenter is a 62 y.o. female who comes in for evaluation of headaches. Ladonna Combs does have a headache at this time. Patient reports onset of significant headaches 4-5 years ago but since January they have been daily ranging from 3-10 in severity usually of 4-5 she does get dizzy initially spinning sensation as well as confusion difficulty speaking sometimes preceded by blurred vision headaches work initially relieved with Depakote but she missed medication and resuming him has not helped her headaches yet headaches are relieved with lying down \"quite place she has had an MRI but does not recall how long ago. She has significant back pain dizziness tremor with the headaches decreased sensation both legs as well as a history of scoliosis questionable neuropathy    Patient returns today reporting no improvement in headaches. She has a severe headache at this time she had a EEG with the severe headache and therefore did not have photic stimulation or hyperventilation. EEG has been now reviewed as normal.  MRI of brain was also reviewed is normal personally reviewed imaging. Laboratory evaluation was just done with the Depakote level subtherapeutic and slightly elevated liver functions. Patient reports liver functions have been elevated in the past and is followed by hepatologist She reports depression.   + Patient reports that another near syncopal episode while in [place] with postictal confusion but no observed seizure activity her headaches are still present and not significantly better with increased Topamax initially she had some diarrhea with the Topamax but is now tolerating it. She feels perhaps the Depakote is helping more than the topiramate.   Description of Headaches:  Location of pain: left-sided unilateral  Radiation of pain?:left-sided unilateral  Character of pain:unable to describe pain  Severity of pain: 3-10 out of 10. Accompanying symptoms: nausea, photophobia, scotomata, vertigo  Prodromal sx?: yes  Rapidity of onset: varies  Typical duration of individual headache: now constant  Are most headaches similar in presentation? yes  Typical precipitants: stress  Worst time of day: varies  Awakens from sleep with pain?: yes -     Current Use of Meds to Treat Headaches:  Abortive meds? sumatriptan PO  Daily use? no  Prophylactic meds? depakote    Additional Relevant History:  History of head/neck trauma? no  Family h/o headache problems? no      Past Medical History:   Diagnosis Date    Headache     Hypertension     Scoliosis     Thyroid disease     Vertigo        Past Surgical History:   Procedure Laterality Date    HX BREAST AUGMENTATION  11/28/2006    HX CHOLECYSTECTOMY      HX COLONOSCOPY  08/2014    HX GASTRIC BYPASS  10/25/2001    HX HYSTERECTOMY  03/17/2003       Social History     Social History    Marital status:      Spouse name: N/A    Number of children: N/A    Years of education: N/A     Occupational History    Not on file.      Social History Main Topics    Smoking status: Never Smoker    Smokeless tobacco: Never Used    Alcohol use 1.8 oz/week     3 Glasses of wine per week      Comment: daily wine 4 glasses    Drug use: No    Sexual activity: Yes     Partners: Male     Birth control/ protection: None     Other Topics Concern    Not on file     Social History Narrative       Review of Systems  A comprehensive review of systems was negative except for: Constitutional: positive for malaise  Musculoskeletal: positive for myalgias, arthralgias, neck pain and back pain  Neurological: positive for headaches, dizziness, vertigo, speech problems and tremor  Behvioral/Psych: positive for anxiety and depression    Physical Exam:    VITAL SIGNS:    Visit Vitals    /60    Pulse 66    Temp 98.2 °F (36.8 °C) (Oral)    Ht 5' 8\" (1.727 m)    Wt 92.3 kg (203 lb 6.4 oz)    SpO2 98%    BMI 30.93 kg/m2       GENERAL: The patient is well developed, well nourished, and in milddistress. EXTREMITIES: No clubbing, cyanosis, or edema is identified. Pulses 2+  and symmetrical.    HEAD:   Ear, nose, and throat appear to be without trauma. The     patient is normocephalic. MUSCULOSKELETAL: Normal Posture. No point tenderness. NEUROLOGIC EXAMINATION    MENTAL STATUS: The patient is awake, alert, and oriented x 3. Speech is fluent and memory appears to be intact, both long and short term. No aphasias or apraxias. CRANIAL NERVES: II - Visual fields are full to confrontation Pupils are both equal and reactive to light and accommodation. III, IV, VI - Extraocular movements are intact and there is no nystagmus. V - Facial sensation is intact to pinprick and light touch. VII - Face is symmetrical.   VIII - Hearing is present. MOTOR:  Tone is normal and symmetric. There is no pronator drift present. The strength is intact for all muscle Sgroups tested in all four extremities. COORDINATION:     gait testing normal     eeg normal , mri brain imaging personally reviewed as normal     Prelim lab reviewed    Procedure: MRI of the brain with and without contrast.     CPT code: 19376     Comparisons: None. Indications: vertigo, cognitive dysfunction,     Technique: Brain scanned with axial and sagittal T1W scans, axial T2W scans,  axial diffusion weighted images, and post gadolinium axial and coronal T1W  scans. cc intravenous Magnevist was administered for this exam.     Findings:        1. Diffusion weighted images are without evidence for acute ischemia. No  evidence for old lacunar or other infarct. 2.  No abnormal area of increased signal noted within the brain parenchyma on  long TE images. 3.  No abnormal areas of contrast enhancement. 4.  There is no evidence for mass, mass effect, or midline shift. 5.  No intra- or extra-axial fluid collections are identified. 6.  The ventricles, sulci, and cisterns are unremarkable for age. 7.  The midline structures of the brain to include the pituitary gland (empty  sella is a normal variant), corpus callosum, and brainstem structures are normal  appearing. 8.  Normal major vessel flow voids are present. Dural sinuses are patent. 9.  No Chiari malformation noted. 10. The leptomeninges are unremarkable. 11. The extra-axial craniofacial structures incidentally imaged are normal.        IMPRESSION  Impression:     Essentially unremarkable MRI of the brain with and without contrast.    Assessment and Plan:  Pool Weber is a 62 y.o. right handed female whose history and physical are consistent with vascular headaches. Paula Combs who has risk factors including gastric bypass,not on dietary supplements, history vitamin b12 and copper deficiencies  Chau Youssef was seen today for headache. Diagnoses and all orders for this visit:    Migraine, transformed  -     HEPATIC FUNCTION PANEL; Future  -     VALPROIC ACID; Future  -     METABOLIC PANEL, COMPREHENSIVE; Future    Migraine with vertigo  -     HEPATIC FUNCTION PANEL; Future  -     VALPROIC ACID; Future  -     METABOLIC PANEL, COMPREHENSIVE; Future    Transient alteration of awareness  -     HEPATIC FUNCTION PANEL; Future  -     VALPROIC ACID; Future  -     METABOLIC PANEL, COMPREHENSIVE; Future    Abnormal laboratory test result  -     HEPATIC FUNCTION PANEL; Future  -     VALPROIC ACID; Future  -     METABOLIC PANEL, COMPREHENSIVE; Future    Other depression  -     HEPATIC FUNCTION PANEL; Future  -     VALPROIC ACID; Future  -     METABOLIC PANEL, COMPREHENSIVE; Future    Other orders  -     topiramate (TOPAMAX) 25 mg tablet; 2  q am 3 qhs      Follow-up Disposition:  Return in about 2 months (around 8/14/2017).      Reviewed work up planned need for dietary supplements ,risks and benefits of depakote therapy and topamax therapy  I spent 40 minutes with the patient in face-to-face consultation, of which greater than 50% was spent in counseling and coordination of care as described above.

## 2017-06-26 ENCOUNTER — TELEPHONE (OUTPATIENT)
Dept: NEUROLOGY | Age: 62
End: 2017-06-26

## 2017-06-26 NOTE — TELEPHONE ENCOUNTER
Pt called stating that her pharmacy sent notification to Dr. Brandon Gil regardnig Effexor and he reordered at 75 mg, instead of sending request to Dr. Rogelio Yusuf. Pt stated she will continue the script of 75 mg 2 until runs out and will call for refill of the Effexor 150 at that time from Dr. Rogelio Yusuf. Pt would like to know if this is agreeable.   Best # to contact pt is [phone]

## 2017-08-15 ENCOUNTER — OFFICE VISIT (OUTPATIENT)
Dept: NEUROLOGY | Age: 62
End: 2017-08-15

## 2017-08-15 VITALS
DIASTOLIC BLOOD PRESSURE: 60 MMHG | OXYGEN SATURATION: 97 % | HEART RATE: 81 BPM | SYSTOLIC BLOOD PRESSURE: 100 MMHG | RESPIRATION RATE: 18 BRPM | TEMPERATURE: 98.6 F | WEIGHT: 195.8 LBS | BODY MASS INDEX: 29.67 KG/M2 | HEIGHT: 68 IN

## 2017-08-15 DIAGNOSIS — F32.89 OTHER DEPRESSION: ICD-10-CM

## 2017-08-15 DIAGNOSIS — I10 ESSENTIAL HYPERTENSION: ICD-10-CM

## 2017-08-15 DIAGNOSIS — G43.709 MIGRAINE, TRANSFORMED: Primary | ICD-10-CM

## 2017-08-15 DIAGNOSIS — G43.109 MIGRAINE WITH VERTIGO: ICD-10-CM

## 2017-08-15 RX ORDER — DIVALPROEX SODIUM 500 MG/1
TABLET, EXTENDED RELEASE ORAL
Qty: 180 TAB | Refills: 2 | Status: SHIPPED | OUTPATIENT
Start: 2017-08-15 | End: 2018-01-16 | Stop reason: SDUPTHER

## 2017-08-15 RX ORDER — TOPIRAMATE 25 MG/1
TABLET ORAL
Qty: 450 TAB | Refills: 2 | Status: SHIPPED | COMMUNITY
Start: 2017-08-15 | End: 2018-01-16 | Stop reason: SDUPTHER

## 2017-08-15 NOTE — PROGRESS NOTES
[facility]             [address] [place], [address]    8/15/2017           Brian Sneed is a 62 y.o. female who comes in for evaluation of headaches. Janet Combs does have a headache at this time. Patient reports onset of significant headaches 4-5 years ago but since January they have been daily ranging from 3-10 in severity usually of 4-5 she does get dizzy initially spinning sensation as well as confusion difficulty speaking sometimes preceded by blurred vision headaches work initially relieved with Depakote but she missed medication and resuming him has not helped her headaches yet headaches are relieved with lying down \"quite place she has had an MRI but does not recall how long ago. She has significant back pain dizziness tremor with the headaches decreased sensation both legs as well as a history of scoliosis questionable neuropathy    Patient returns today reporting no improvement in headaches. She has a severe headache at this time she had a EEG with the severe headache and therefore did not have photic stimulation or hyperventilation. EEG has been now reviewed as normal.  MRI of brain was also reviewed is normal personally reviewed imaging. Laboratory evaluation was just done with the Depakote level subtherapeutic and slightly elevated liver functions. Patient reports liver functions have been elevated in the past and is followed by hepatologist She reports depression.   + Patient reports that another near syncopal episode while in [place] with postictal confusion but no observed seizure activity her headaches are still present and not significantly better with increased Topamax initially she had some diarrhea with the Topamax but is now tolerating it. She feels perhaps the Depakote is helping more than the topiramate. Patient reports doing much better with fewer severe headaches.   She is tolerating all the medications well she is taking the Effexor 150 mg the Topamax as prescribed to Depakote at night. She forgot to get the lab work [place] evaluation  Description of Headaches:  Location of pain: left-sided unilateral  Radiation of pain?:left-sided unilateral  Character of pain:unable to describe pain  Severity of pain: 3-10 out of 10. Accompanying symptoms: nausea, photophobia, scotomata, vertigo  Prodromal sx?: yes  Rapidity of onset: varies  Typical duration of individual headache: now constant  Are most headaches similar in presentation? yes  Typical precipitants: stress  Worst time of day: varies  Awakens from sleep with pain?: yes -     Current Use of Meds to Treat Headaches:  Abortive meds? sumatriptan PO  Daily use? no  Prophylactic meds? depakote    Additional Relevant History:  History of head/neck trauma? no  Family h/o headache problems? no      Past Medical History:   Diagnosis Date    Headache     Hypertension     Scoliosis     Thyroid disease     Vertigo        Past Surgical History:   Procedure Laterality Date    HX BREAST AUGMENTATION  11/28/2006    HX CHOLECYSTECTOMY      HX COLONOSCOPY  08/2014    HX GASTRIC BYPASS  10/25/2001    HX HYSTERECTOMY  03/17/2003       Social History     Social History    Marital status:      Spouse name: N/A    Number of children: N/A    Years of education: N/A     Occupational History    Not on file.      Social History Main Topics    Smoking status: Never Smoker    Smokeless tobacco: Never Used    Alcohol use 1.8 oz/week     3 Glasses of wine per week      Comment: daily wine 4 glasses    Drug use: No    Sexual activity: Yes     Partners: Male     Birth control/ protection: None     Other Topics Concern    Not on file     Social History Narrative       Review of Systems  A comprehensive review of systems was negative except for: Constitutional: positive for malaise  Musculoskeletal: positive for myalgias, arthralgias, neck pain and back pain  Neurological: positive for headaches, dizziness, vertigo, speech problems and tremor  Behvioral/Psych: positive for anxiety and depression    Physical Exam:    VITAL SIGNS:    Visit Vitals    /60    Pulse 81    Temp 98.6 °F (37 °C) (Oral)    Resp 18    Ht 5' 8\" (1.727 m)    Wt 88.8 kg (195 lb 12.8 oz)    SpO2 97%    BMI 29.77 kg/m2       GENERAL: The patient is well developed, well nourished, and in milddistress. EXTREMITIES: No clubbing, cyanosis, or edema is identified. Pulses 2+  and symmetrical.    HEAD:   Ear, nose, and throat appear to be without trauma. The     patient is normocephalic. MUSCULOSKELETAL: Normal Posture. No point tenderness. NEUROLOGIC EXAMINATION    MENTAL STATUS: The patient is awake, alert, and oriented x 3. Speech is fluent and memory appears to be intact, both long and short term. No aphasias or apraxias. CRANIAL NERVES: II - Visual fields are full to confrontation Pupils are both equal and reactive to light and accommodation. III, IV, VI - Extraocular movements are intact and there is no nystagmus. V - Facial sensation is intact to pinprick and light touch. VII - Face is symmetrical.   VIII - Hearing is present. MOTOR:  Tone is normal and symmetric. There is no pronator drift present. The strength is intact for all muscle Sgroups tested in all four extremities. COORDINATION:     gait testing normal     eeg normal , mri brain imaging personally reviewed as normal     Prelim lab reviewed    Procedure: MRI of the brain with and without contrast.     CPT code: 26840     Comparisons: None. Indications: vertigo, cognitive dysfunction,     Technique: Brain scanned with axial and sagittal T1W scans, axial T2W scans,  axial diffusion weighted images, and post gadolinium axial and coronal T1W  scans. cc intravenous Magnevist was administered for this exam.     Findings:        1. Diffusion weighted images are without evidence for acute ischemia.  No  evidence for old lacunar or other infarct. 2.  No abnormal area of increased signal noted within the brain parenchyma on  long TE images. 3.  No abnormal areas of contrast enhancement. 4.  There is no evidence for mass, mass effect, or midline shift. 5.  No intra- or extra-axial fluid collections are identified. 6.  The ventricles, sulci, and cisterns are unremarkable for age. 7.  The midline structures of the brain to include the pituitary gland (empty  sella is a normal variant), corpus callosum, and brainstem structures are normal  appearing. 8.  Normal major vessel flow voids are present. Dural sinuses are patent. 9.  No Chiari malformation noted. 10. The leptomeninges are unremarkable. 11. The extra-axial craniofacial structures incidentally imaged are normal.        IMPRESSION  Impression:     Essentially unremarkable MRI of the brain with and without contrast.    Assessment and Plan:  Wai Araiza is a 62 y.o. right handed female whose history and physical are consistent with vascular headaches. Rasta Dominguez Pack who has risk factors including gastric bypass,not on dietary supplements, history vitamin b12 and copper deficiencies  Diagnoses and all orders for this visit:    1. Migraine, transformed    2. Migraine with vertigo    3. Other depression    4. Essential hypertension    Other orders  -     topiramate (TOPAMAX) 25 mg tablet; 2  q am 3 qhs  -     divalproex ER (DEPAKOTE ER) 500 mg ER tablet; 2 at bedtime nightly      Follow-up Disposition:  Return in about 4 months (around 12/15/2017). Reviewed work up planned need for dietary supplements ,risks and benefits of depakote therapy and topamax therapy  I spent 30 minutes with the patient in face-to-face consultation, of which greater than 50% was spent in counseling and coordination of care as described above.

## 2017-09-27 ENCOUNTER — OFFICE VISIT (OUTPATIENT)
Dept: FAMILY MEDICINE CLINIC | Age: 62
End: 2017-09-27

## 2017-09-27 ENCOUNTER — HOSPITAL ENCOUNTER (OUTPATIENT)
Dept: LAB | Age: 62
Discharge: HOME | End: 2017-09-27
Payer: COMMERCIAL

## 2017-09-27 ENCOUNTER — DOCUMENTATION ONLY (OUTPATIENT)
Dept: FAMILY MEDICINE CLINIC | Age: 62
End: 2017-09-27

## 2017-09-27 VITALS
HEART RATE: 73 BPM | TEMPERATURE: 96.7 F | DIASTOLIC BLOOD PRESSURE: 82 MMHG | RESPIRATION RATE: 18 BRPM | SYSTOLIC BLOOD PRESSURE: 124 MMHG | BODY MASS INDEX: 29.55 KG/M2 | OXYGEN SATURATION: 95 % | HEIGHT: 68 IN | WEIGHT: 195 LBS

## 2017-09-27 DIAGNOSIS — G43.109 MIGRAINE WITH VERTIGO: ICD-10-CM

## 2017-09-27 DIAGNOSIS — R89.9 ABNORMAL LABORATORY TEST RESULT: ICD-10-CM

## 2017-09-27 DIAGNOSIS — F32.89 OTHER DEPRESSION: ICD-10-CM

## 2017-09-27 DIAGNOSIS — R40.4 TRANSIENT ALTERATION OF AWARENESS: ICD-10-CM

## 2017-09-27 DIAGNOSIS — Z23 ENCOUNTER FOR IMMUNIZATION: ICD-10-CM

## 2017-09-27 DIAGNOSIS — I10 ESSENTIAL HYPERTENSION: ICD-10-CM

## 2017-09-27 DIAGNOSIS — E03.9 HYPOTHYROIDISM, UNSPECIFIED TYPE: ICD-10-CM

## 2017-09-27 DIAGNOSIS — G43.709 MIGRAINE, TRANSFORMED: ICD-10-CM

## 2017-09-27 DIAGNOSIS — I10 ESSENTIAL HYPERTENSION: Primary | ICD-10-CM

## 2017-09-27 DIAGNOSIS — G62.9 NEUROPATHY: ICD-10-CM

## 2017-09-27 LAB
ALBUMIN SERPL-MCNC: 4 G/DL (ref 3.4–5)
ALBUMIN/GLOB SERPL: 1.3 {RATIO} (ref 0.8–1.7)
ALP SERPL-CCNC: 96 U/L (ref 45–117)
ALT SERPL-CCNC: 56 U/L (ref 13–56)
ANION GAP SERPL CALC-SCNC: 8 MMOL/L (ref 3–18)
AST SERPL-CCNC: 48 U/L (ref 15–37)
BASOPHILS # BLD: 0 K/UL (ref 0–0.06)
BASOPHILS NFR BLD: 1 % (ref 0–2)
BILIRUB DIRECT SERPL-MCNC: 0.1 MG/DL (ref 0–0.2)
BILIRUB SERPL-MCNC: 0.3 MG/DL (ref 0.2–1)
BUN SERPL-MCNC: 18 MG/DL (ref 7–18)
BUN/CREAT SERPL: 25 (ref 12–20)
CALCIUM SERPL-MCNC: 9.2 MG/DL (ref 8.5–10.1)
CHLORIDE SERPL-SCNC: 107 MMOL/L (ref 100–108)
CHOLEST SERPL-MCNC: 235 MG/DL
CO2 SERPL-SCNC: 26 MMOL/L (ref 21–32)
CREAT SERPL-MCNC: 0.71 MG/DL (ref 0.6–1.3)
DIFFERENTIAL METHOD BLD: ABNORMAL
EOSINOPHIL # BLD: 0.2 K/UL (ref 0–0.4)
EOSINOPHIL NFR BLD: 3 % (ref 0–5)
ERYTHROCYTE [DISTWIDTH] IN BLOOD BY AUTOMATED COUNT: 13.1 % (ref 11.6–14.5)
GLOBULIN SER CALC-MCNC: 3.1 G/DL (ref 2–4)
GLUCOSE SERPL-MCNC: 93 MG/DL (ref 74–99)
HCT VFR BLD AUTO: 42.2 % (ref 35–45)
HDLC SERPL-MCNC: 81 MG/DL (ref 40–60)
HDLC SERPL: 2.9 {RATIO} (ref 0–5)
HGB BLD-MCNC: 13.8 G/DL (ref 12–16)
LDLC SERPL CALC-MCNC: 119.2 MG/DL (ref 0–100)
LIPID PROFILE,FLP: ABNORMAL
LYMPHOCYTES # BLD: 2.1 K/UL (ref 0.9–3.6)
LYMPHOCYTES NFR BLD: 36 % (ref 21–52)
MCH RBC QN AUTO: 33.9 PG (ref 24–34)
MCHC RBC AUTO-ENTMCNC: 32.7 G/DL (ref 31–37)
MCV RBC AUTO: 103.7 FL (ref 74–97)
MONOCYTES # BLD: 0.6 K/UL (ref 0.05–1.2)
MONOCYTES NFR BLD: 10 % (ref 3–10)
NEUTS SEG # BLD: 3 K/UL (ref 1.8–8)
NEUTS SEG NFR BLD: 50 % (ref 40–73)
PLATELET # BLD AUTO: 222 K/UL (ref 135–420)
PMV BLD AUTO: 10.5 FL (ref 9.2–11.8)
POTASSIUM SERPL-SCNC: 3.7 MMOL/L (ref 3.5–5.5)
PROT SERPL-MCNC: 7.1 G/DL (ref 6.4–8.2)
RBC # BLD AUTO: 4.07 M/UL (ref 4.2–5.3)
SODIUM SERPL-SCNC: 141 MMOL/L (ref 136–145)
TRIGL SERPL-MCNC: 174 MG/DL (ref ?–150)
TSH SERPL DL<=0.05 MIU/L-ACNC: 1.83 UIU/ML (ref 0.36–3.74)
VLDLC SERPL CALC-MCNC: 34.8 MG/DL
WBC # BLD AUTO: 5.9 K/UL (ref 4.6–13.2)

## 2017-09-27 PROCEDURE — 82248 BILIRUBIN DIRECT: CPT | Performed by: PSYCHIATRY & NEUROLOGY

## 2017-09-27 PROCEDURE — 80164 ASSAY DIPROPYLACETIC ACD TOT: CPT | Performed by: PSYCHIATRY & NEUROLOGY

## 2017-09-27 PROCEDURE — 80061 LIPID PANEL: CPT | Performed by: PSYCHIATRY & NEUROLOGY

## 2017-09-27 PROCEDURE — 84443 ASSAY THYROID STIM HORMONE: CPT | Performed by: PSYCHIATRY & NEUROLOGY

## 2017-09-27 PROCEDURE — 85025 COMPLETE CBC W/AUTO DIFF WBC: CPT | Performed by: PSYCHIATRY & NEUROLOGY

## 2017-09-27 PROCEDURE — 80053 COMPREHEN METABOLIC PANEL: CPT | Performed by: PSYCHIATRY & NEUROLOGY

## 2017-09-27 NOTE — PROGRESS NOTES
Chief Complaint   Patient presents with    Follow-up     1. Have you been to the ER, urgent care clinic since your last visit? Hospitalized since your last visit? No    2. Have you seen or consulted any other health care providers outside of the [address] [place] [address] since your last visit? Include any pap smears or colon screening. No     HPI  Lor Jamison comes in for follow-up care. Migraine with vertigo: Patient has a history of migrainous vertigo. She has been followed up by the neurologist.  Currently she takes Topamax and the Depakote. Stable on medication. Has not had a migrainous headache for months now. Does need to get her valproic acid levels checked. We will order the test today. Neuropathy: Patient on gabapentin. We will continue with the medication. Low back pain: Patient has been followed up in the pain clinic and currently receives epidural injections. She also has had radiofrequency treatment done and that this continues. She is taking hydrocodone for pain as needed. Overall feels much improved. We will continue with this. Hypothyroidism: We will check TSH today. Patient has been on thyroid medication. Hypertension: Blood pressure is stable. Patient on Benicar. Will check labs today.     Past Medical History  Past Medical History:   Diagnosis Date    Headache     Hypertension     Scoliosis     Thyroid disease     Vertigo        Surgical History  Past Surgical History:   Procedure Laterality Date    HX BREAST AUGMENTATION  11/28/2006    HX CHOLECYSTECTOMY      HX COLONOSCOPY  08/2014    HX GASTRIC BYPASS  10/25/2001    HX HYSTERECTOMY  03/17/2003        Medications  Current Outpatient Prescriptions   Medication Sig Dispense Refill    topiramate (TOPAMAX) 25 mg tablet 2  q am 3 qhs 450 Tab 2    divalproex ER (DEPAKOTE ER) 500 mg ER tablet 2 at bedtime nightly 180 Tab 2    gabapentin (NEURONTIN) 100 mg capsule TAKE 2 BY MOUTH THREE TIMES DAILY 540 Cap 0    methocarbamol (ROBAXIN) 750 mg tablet TAKE 1 BY MOUTH 3 TIMES DAILY 270 Tab 0    cyanocobalamin, vitamin B-12, (VITAMIN B-12) 1,000 mcg/mL drop Take  by mouth.  magnesium oxide (MAG-OX) 400 mg tablet Take 400 mg by mouth daily.  thiamine (VITAMIN B-1) 100 mg tablet Take  by mouth daily.  venlafaxine-SR (EFFEXOR-XR) 150 mg capsule 1 cap daily 30 Cap 2    BENICAR 20 mg tablet Take 1 Tab by mouth daily. 90 Tab 1    diazePAM (VALIUM) 5 mg tablet Take 1 Tab by mouth daily as needed for Anxiety (vertigo). 30 Tab 0    cholecalciferol (VITAMIN D3) 1,000 unit cap Take  by mouth daily.  Omega-3-DHA-EPA-Fish Oil 1,000 mg (120 mg-180 mg) cap Take  by mouth.  MULTIVIT WITH CALCIUM,IRON,MIN (WOMEN'S DAILY MULTIVITAMIN PO) Take  by mouth.  Estradiol (EVAMIST) 1.53 mg/spray (1.7%) spry by TransDERmal route.  azelastine-fluticasone (DYMISTA) 137-50 mcg/spray spry by Nasal route.  cyclobenzaprine (FLEXERIL) 10 mg tablet Take  by mouth three (3) times daily as needed for Muscle Spasm(s).  levothyroxine (SYNTHROID) 50 mcg tablet Take  by mouth Daily (before breakfast).  HYDROcodone-acetaminophen (LORTAB) 7.5-500 mg per tablet Take  by mouth every four (4) hours as needed for Pain. Allergies  Allergies   Allergen Reactions    Other Medication Anaphylaxis     ANTI INFLAMATORY    Sulfa (Sulfonamide Antibiotics) Rash       Family History  Family History   Problem Relation Age of Onset    Elevated Lipids Mother     Hypertension Brother        Social History  Social History     Social History    Marital status:      Spouse name: N/A    Number of children: N/A    Years of education: N/A     Occupational History    Not on file.      Social History Main Topics    Smoking status: Never Smoker    Smokeless tobacco: Never Used    Alcohol use 1.8 oz/week     3 Glasses of wine per week      Comment: daily wine 4 glasses    Drug use: No    Sexual activity: Yes Partners: Male     Birth control/ protection: None     Other Topics Concern    Not on file     Social History Narrative       Review of Systems  Review of Systems - History obtained from chart review and the patient  General ROS: positive for  - fatigue and malaise  negative for - chills or fever  Psychological ROS: positive for - anxiety, depression and mood swings  Ophthalmic ROS: negative  Allergy and Immunology ROS: negative  Hematological and Lymphatic ROS: negative  Respiratory ROS: no cough, shortness of breath, or wheezing  Cardiovascular ROS: no chest pain or dyspnea on exertion  Gastrointestinal ROS: no abdominal pain, change in bowel habits, or black or bloody stools  Genito-Urinary ROS: negative  Musculoskeletal ROS: positive for - pain in back - lower  Neurological ROS: positive for - headaches and numbness/tingling    Vital Signs  Visit Vitals    /82 (BP 1 Location: Left arm, BP Patient Position: Sitting)    Pulse 73    Temp 96.7 °F (35.9 °C) (Oral)    Resp 18    Ht 5' 8\" (1.727 m)    Wt 195 lb (88.5 kg)    SpO2 95%    BMI 29.65 kg/m2         Physical Exam  Physical Examination: General appearance - oriented to person, place, and time, acyanotic, in no respiratory distress and well hydrated  Mental status - alert, oriented to person, place, and time, affect appropriate to mood  Mouth - mucous membranes moist, pharynx normal without lesions  Neck - supple, no significant adenopathy  Chest - clear to auscultation, no wheezes, rales or rhonchi, symmetric air entry  Heart - normal rate and regular rhythm, S1 and S2 normal  Neurological - alert, oriented, normal speech, no focal findings or movement disorder noted, motor and sensory grossly normal bilaterally  Musculoskeletal - no muscular tenderness noted  Extremities - no pedal edema noted, intact peripheral pulses    Diagnostics  No orders of the defined types were placed in this encounter.         Results  Results for orders placed or performed during the hospital encounter of 04/24/17   COPPER   Result Value Ref Range    Copper, serum 95 72 - 166 ug/dL   VITAMIN B12   Result Value Ref Range    Vitamin B12 751 211 - 911 pg/mL   VITAMIN B1, WHOLE BLOOD   Result Value Ref Range    Vitamin B1 149.3 66.5 - 200.0 nmol/L   HEPATIC FUNCTION PANEL   Result Value Ref Range    Protein, total 6.9 6.4 - 8.2 g/dL    Albumin 3.6 3.4 - 5.0 g/dL    Globulin 3.3 2.0 - 4.0 g/dL    A-G Ratio 1.1 0.8 - 1.7      Bilirubin, total 0.5 0.2 - 1.0 MG/DL    Bilirubin, direct 0.2 0.0 - 0.2 MG/DL    Alk. phosphatase 104 45 - 117 U/L    AST (SGOT) 102 (H) 15 - 37 U/L    ALT (SGPT) 84 (H) 13 - 56 U/L   VALPROIC ACID   Result Value Ref Range    Valproic acid 32 (L) 50 - 100 ug/ml     ASSESSMENT and PLAN    ICD-10-CM ICD-9-CM    1. Essential hypertension I10 401.9 CBC WITH AUTOMATED DIFF      LIPID PANEL   2. Neuropathy (HCC) G62.9 355.9    3. Migraine with vertigo G43.109 346.00    4. Hypothyroidism, unspecified type E03.9 244.9 TSH 3RD GENERATION     current treatment plan is effective, no change in therapy  lab results and schedule of future lab studies reviewed with patient  reviewed diet, exercise and weight control  reviewed medications and side effects in detail    I have discussed the diagnosis with the patient and the intended plan of care as seen in the above orders. The patient has received an after-visit summary and questions were answered concerning future plans. I have discussed medication, side effects, and warnings with the patient in detail. The patient verbalized understanding and is in agreement with the plan of care. The patient will contact the office with any additional concerns.     Rhona Parker MD

## 2017-09-27 NOTE — PROGRESS NOTES
Roberto Riggs is a 62 y.o. female who presents for routine immunizations. She denies any symptoms , reactions or allergies that would exclude them from being immunized today. Risks and adverse reactions were discussed and the VIS was given to them. All questions were addressed. She was observed for 15 min post injection. There were no reactions observed. Sabrina Reddy LPN        Roberto Riggs is a 62 y.o. female who presents for routine immunizations. She denies any symptoms , reactions or allergies that would exclude them from being immunized today. Risks and adverse reactions were discussed and the VIS was given to them. All questions were addressed. She was observed for 15 min post injection. There were no reactions observed.     Sabrina Reddy LPN

## 2017-09-27 NOTE — MR AVS SNAPSHOT
Visit Information Date & Time Provider Department Dept. Phone Encounter #  
 9/27/2017  3:45 PM Nained Zuly Andrew MD [facility] [phone] Follow-up Instructions Return in about 6 months (around 3/27/2018), or if symptoms worsen or fail to improve, for migraine headache, neuropathy. Your Appointments 12/14/2017  4:00 PM  
Follow Up with Vince Russell MD  
[facility] Appt Note: 4 mo f/u  
 [address] [facility] [address] [address] [phone]  
  
   
 [place] [id number] Upcoming Health Maintenance Date Due  
 PAP AKA CERVICAL CYTOLOGY 11/6/1977 ZOSTER VACCINE AGE 60> 9/6/2016 INFLUENZA AGE 9 TO ADULT 8/1/2017 FOBT Q 1 YEAR AGE 50-75 2/22/2018 BREAST CANCER SCRN MAMMOGRAM 6/28/2018 DTaP/Tdap/Td series (3 - Td) 2/21/2027 Allergies as of 9/27/2017  Review Complete On: 8/15/2017 By: Vince Russell MD  
  
 Severity Noted Reaction Type Reactions Other Medication High 03/15/2017    Anaphylaxis ANTI INFLAMATORY Sulfa (Sulfonamide Antibiotics)  01/26/2017    Rash Current Immunizations  Reviewed on 2/21/2017 Name Date Tdap 2/21/2017 Not reviewed this visit You Were Diagnosed With   
  
 Codes Comments [facility]    -  Primary ICD-10-CM: G62.9 ICD-9-CM: 355.9 Migraine with vertigo     ICD-10-CM: G43.109 ICD-9-CM: 346.00 Hypothyroidism, unspecified type     ICD-10-CM: E03.9 ICD-9-CM: 244.9 Essential hypertension     ICD-10-CM: I10 
ICD-9-CM: 401.9 Vitals BP Pulse Temp Resp Height(growth percentile) Weight(growth percentile) 124/82 (BP 1 Location: Left arm, BP Patient Position: Sitting) 73 96.7 °F (35.9 °C) (Oral) 18 5' 8\" (1.727 m) 195 lb (88.5 kg) SpO2 BMI OB Status Smoking Status 95% 29.65 kg/m2 Hysterectomy Never Smoker BMI and BSA Data Body Mass Index Body Surface Area  
 29.65 kg/m 2 2.06 m 2 Preferred Pharmacy Pharmacy Name Phone [facility]. Saul [facility] -[phone] Your Updated Medication List  
  
   
This list is accurate as of: 9/27/17  4:34 PM.  Always use your most recent med list.  
  
  
  
  
 BENICAR 20 mg tablet Generic drug:  olmesartan Take 1 Tab by mouth daily. cyclobenzaprine 10 mg tablet Commonly known as:  FLEXERIL Take  by mouth three (3) times daily as needed for Muscle Spasm(s). diazePAM 5 mg tablet Commonly known as:  VALIUM Take 1 Tab by mouth daily as needed for Anxiety (vertigo). divalproex  mg ER tablet Commonly known as:  DEPAKOTE ER  
2 at bedtime nightly DYMISTA 137-50 mcg/spray [place] Generic drug:  azelastine-fluticasone  
by Nasal route. EVAMIST 1.53 mg/spray (1.7%) Spry Generic drug:  Estradiol  
by TransDERmal route.  
  
 gabapentin 100 mg capsule Commonly known as:  NEURONTIN  
TAKE 2 BY MOUTH THREE TIMES DAILY HYDROcodone-acetaminophen 7.5-500 mg per tablet Commonly known as:  Danney Angelucci Take  by mouth every four (4) hours as needed for Pain.  
  
 levothyroxine 50 mcg tablet Commonly known as:  SYNTHROID Take  by mouth Daily (before breakfast). magnesium oxide 400 mg tablet Commonly known as:  MAG-OX Take 400 mg by mouth daily. methocarbamol 750 mg tablet Commonly known as:  ROBAXIN  
TAKE 1 BY MOUTH 3 TIMES DAILY Omega-3-DHA-EPA-Fish Oil 1,000 mg (120 mg-180 mg) Cap Take  by mouth. topiramate 25 mg tablet Commonly known as:  TOPAMAX  
2  q am 3 qhs  
  
 varicella zoster vacine live 19,400 unit/0.65 mL Susr injection Commonly known as:  ZOSTAVAX  
1 Vial by SubCUTAneous route once for 1 dose. venlafaxine- mg capsule Commonly known as:  EFFEXOR XR  
1 cap daily VITAMIN B-1 100 mg tablet Generic drug:  thiamine Take  by mouth daily. VITAMIN B-12 1,000 mcg/mL Drop Generic drug:  cyanocobalamin (vitamin B-12) Take  by mouth. VITAMIN D3 1,000 unit Cap Generic drug:  cholecalciferol Take  by mouth daily. WOMEN'S DAILY MULTIVITAMIN PO Take  by mouth. Prescriptions Printed Refills  
 varicella zoster vacine live (ZOSTAVAX) 19,400 unit/0.65 mL susr injection 0 Si Vial by SubCUTAneous route once for 1 dose. Class: Print Route: SubCUTAneous Follow-up Instructions Return in about 6 months (around 3/27/2018), or if symptoms worsen or fail to improve, for migraine headache, neuropathy. To-Do List   
 2017 Lab:  CBC WITH AUTOMATED DIFF   
  
 2017 Lab:  LIPID PANEL   
  
 2017 Lab:  TSH 3RD GENERATION Please provide this summary of care documentation to your next provider. Your primary care clinician is listed as Berta Rivera. If you have any questions after today's visit, please call [phone].

## 2017-09-28 LAB — VALPROATE SERPL-MCNC: 40 UG/ML (ref 50–100)

## 2017-09-28 NOTE — PROGRESS NOTES
Flu vaccine CPT code 09312 is incorrect, verified the [facility] number and the correct CPT code is 37389. Corrected in the [address] before dropping the claim.

## 2017-09-28 NOTE — PROGRESS NOTES
Please let patient know that her valproic acid level is 40. This is slightly low and she will follow-up with the neurologist on this. Her LDL cholesterol is 119. Would want this to be below 100. I will have her take a diet low in polysaturated fats and exercise. Recheck labs in 3 months. If still elevated she should consider cholesterol-lowering medication.   Nuris Torres MD

## 2017-10-04 NOTE — PROGRESS NOTES
Call made to pt and 2 identifiers used. Pt made aware of above, and understands. 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 Will make  aware.

## 2017-10-11 ENCOUNTER — TELEPHONE (OUTPATIENT)
Dept: FAMILY MEDICINE CLINIC | Age: 62
End: 2017-10-11

## 2017-10-11 DIAGNOSIS — C44.91 BASAL CELL CARCINOMA, UNSPECIFIED SITE: Primary | ICD-10-CM

## 2017-10-11 NOTE — TELEPHONE ENCOUNTER
Call made to pt, no answer, left message to make aware a referral had been ordered by  for a Dermatologist as she requested. She will be notified with needed information once referral is done.

## 2017-10-13 ENCOUNTER — TELEPHONE (OUTPATIENT)
Dept: FAMILY MEDICINE CLINIC | Age: 62
End: 2017-10-13

## 2017-10-13 NOTE — TELEPHONE ENCOUNTER
Patient's  called regarding shingles shot. He states the pharmacy is out of stock and doesn't know when the warehouse will have more. They've called around to 9 pharmacies. Patient and  would like the shot. They want to know if Dr. Jessica Stallings can get it for them.

## 2017-12-08 NOTE — TELEPHONE ENCOUNTER
Requested Prescriptions     Pending Prescriptions Disp Refills    BENICAR 20 mg tablet 90 Tab 1     Sig: Take 1 Tab by mouth daily.

## 2017-12-11 RX ORDER — OLMESARTAN MEDOXOMIL 20 MG/1
20 TABLET, FILM COATED ORAL DAILY
Qty: 90 TAB | Refills: 1 | Status: SHIPPED | OUTPATIENT
Start: 2017-12-11 | End: 2018-01-31 | Stop reason: SDUPTHER

## 2018-01-16 ENCOUNTER — OFFICE VISIT (OUTPATIENT)
Dept: NEUROLOGY | Age: 63
End: 2018-01-16

## 2018-01-16 ENCOUNTER — DOCUMENTATION ONLY (OUTPATIENT)
Dept: NEUROLOGY | Age: 63
End: 2018-01-16

## 2018-01-16 VITALS
RESPIRATION RATE: 20 BRPM | DIASTOLIC BLOOD PRESSURE: 84 MMHG | BODY MASS INDEX: 29.07 KG/M2 | WEIGHT: 191.8 LBS | TEMPERATURE: 97.6 F | OXYGEN SATURATION: 98 % | SYSTOLIC BLOOD PRESSURE: 120 MMHG | HEART RATE: 69 BPM | HEIGHT: 68 IN

## 2018-01-16 DIAGNOSIS — G43.709 MIGRAINE, TRANSFORMED: Primary | ICD-10-CM

## 2018-01-16 DIAGNOSIS — G43.109 MIGRAINE WITH VERTIGO: ICD-10-CM

## 2018-01-16 DIAGNOSIS — F32.89 OTHER DEPRESSION: ICD-10-CM

## 2018-01-16 DIAGNOSIS — I10 ESSENTIAL HYPERTENSION: ICD-10-CM

## 2018-01-16 DIAGNOSIS — G62.9 NEUROPATHY: ICD-10-CM

## 2018-01-16 RX ORDER — TOPIRAMATE 25 MG/1
TABLET ORAL
Qty: 450 TAB | Refills: 2 | Status: SHIPPED | OUTPATIENT
Start: 2018-01-16

## 2018-01-16 RX ORDER — VENLAFAXINE HYDROCHLORIDE 75 MG/1
CAPSULE, EXTENDED RELEASE ORAL
Qty: 90 CAP | Refills: 1 | Status: SHIPPED | OUTPATIENT
Start: 2018-01-16 | End: 2018-04-19 | Stop reason: SDUPTHER

## 2018-01-16 RX ORDER — DIVALPROEX SODIUM 500 MG/1
TABLET, EXTENDED RELEASE ORAL
Qty: 180 TAB | Refills: 2 | Status: SHIPPED | OUTPATIENT
Start: 2018-01-16 | End: 2018-05-07 | Stop reason: SDUPTHER

## 2018-01-16 RX ORDER — GABAPENTIN 100 MG/1
CAPSULE ORAL
Qty: 540 CAP | Refills: 1 | Status: SHIPPED | OUTPATIENT
Start: 2018-01-16

## 2018-01-16 NOTE — PROGRESS NOTES
Que Wharton is a 65 y.o. female in today for follow-up on migraines. 1. Have you been to the ER, urgent care clinic since your last visit? Hospitalized since your last visit? No    2. Have you seen or consulted any other health care providers outside of the [address] [place] [address] since your last visit? Include any pap smears or colon screening.  No

## 2018-01-16 NOTE — PROGRESS NOTES
[facility]             [address] [facility], [address] [place] [address] Dr [place], [address]    1/16/2018           Nadeem Quinonez is a 65 y.o. female who comes in for evaluation of headaches. Miller Combs does have a headache at this time. Patient reports onset of significant headaches 4-5 years ago but since January they have been daily ranging from 3-10 in severity usually of 4-5 she does get dizzy initially spinning sensation as well as confusion difficulty speaking sometimes preceded by blurred vision headaches work initially relieved with Depakote but she missed medication and resuming him has not helped her headaches yet headaches are relieved with lying down \"quite place she has had an MRI but does not recall how long ago. She has significant back pain dizziness tremor with the headaches decreased sensation both legs as well as a history of scoliosis questionable neuropathy    Patient returns today reporting no improvement in headaches. She has a severe headache at this time she had a EEG with the severe headache and therefore did not have photic stimulation or hyperventilation. EEG has been now reviewed as normal.  MRI of brain was also reviewed is normal personally reviewed imaging. Laboratory evaluation was just done with the Depakote level subtherapeutic and slightly elevated liver functions. Patient reports liver functions have been elevated in the past and is followed by hepatologist She reports depression.   + Patient reports that another near syncopal episode while in [place] with postictal confusion but no observed seizure activity her headaches are still present and not significantly better with increased Topamax initially she had some diarrhea with the Topamax but is now tolerating it. She feels perhaps the Depakote is helping more than the topiramate. Patient reports doing much better with fewer severe headaches.   She is tolerating all the medications well she is taking the Effexor 75mg mg the Topamax as prescribed to Depakote at night      She returns reporting she fell off a chair striking her head while attempting to clean clean a ceiling fan in October she still has a sore spot on her scalp. She headaches have continued to be improved she has not passed out. She wishes to have refills on the Topamax gabapentin Effexor through this office  Description of Headaches:  Location of pain: left-sided unilateral  Radiation of pain?:left-sided unilateral  Character of pain:unable to describe pain  Severity of pain: 3-10 out of 10. Accompanying symptoms: nausea, photophobia, scotomata, vertigo  Prodromal sx?: yes  Rapidity of onset: varies  Typical duration of individual headache: now constant  Are most headaches similar in presentation? yes  Typical precipitants: stress  Worst time of day: varies  Awakens from sleep with pain?: yes -     Current Use of Meds to Treat Headaches:  Abortive meds? sumatriptan PO  Daily use? no  Prophylactic meds? depakote    Additional Relevant History:  History of head/neck trauma? no  Family h/o headache problems? no      Past Medical History:   Diagnosis Date    Headache     Hypertension     Scoliosis     Thyroid disease     Vertigo        Past Surgical History:   Procedure Laterality Date    HX BREAST AUGMENTATION  11/28/2006    HX CHOLECYSTECTOMY      HX COLONOSCOPY  08/2014    HX GASTRIC BYPASS  10/25/2001    HX HYSTERECTOMY  03/17/2003       Social History     Social History    Marital status:      Spouse name: N/A    Number of children: N/A    Years of education: N/A     Occupational History    Not on file.      Social History Main Topics    Smoking status: Never Smoker    Smokeless tobacco: Never Used    Alcohol use 1.8 oz/week     3 Glasses of wine per week      Comment: daily wine 4 glasses    Drug use: No    Sexual activity: Yes     Partners: Male     Birth control/ protection: None     Other Topics Concern    Not on file     Social History Narrative       Review of Systems  A comprehensive review of systems was negative except for: Constitutional: positive for malaise  Musculoskeletal: positive for myalgias, arthralgias, neck pain and back pain  Neurological: positive for headaches, dizziness, vertigo, speech problems and tremor  Behvioral/Psych: positive for anxiety and depression    Physical Exam:    VITAL SIGNS:    Visit Vitals    /84 (BP 1 Location: Left arm, BP Patient Position: Sitting)    Pulse 69    Temp 97.6 °F (36.4 °C) (Oral)    Resp 20    Ht 5' 8\" (1.727 m)    Wt 87 kg (191 lb 12.8 oz)    SpO2 98%    BMI 29.16 kg/m2       GENERAL: The patient is well developed, well nourished, and in milddistress. EXTREMITIES: No clubbing, cyanosis, or edema is identified. Pulses 2+  and symmetrical.    HEAD:   Ear, nose, and throat appear to be without trauma. The     patient is normocephalic. MUSCULOSKELETAL: Normal Posture. NEUROLOGIC EXAMINATION    MENTAL STATUS: The patient is awake, alert, and oriented x 3. Speech is fluent and memory appears to be intact, both long and short term. No aphasias or apraxias. CRANIAL NERVES: II - Visual fields are full to confrontation Pupils are both equal and reactive to light and accommodation. III, IV, VI - Extraocular movements are intact and there is no nystagmus. Amanda Master VII - Face is symmetrical.   VIII - Hearing is present. MOTOR:  Tone is normal and symmetric. There is no pronator drift present. The strength is intact for all muscle Sgroups tested in all four extremities. COORDINATION:     gait testing normal     eeg normal , mri brain imaging personally reviewed as normal     Prelim lab reviewed    Procedure: MRI of the brain with and without contrast.     CPT code: 14813     Comparisons: None.      Indications: vertigo, cognitive dysfunction,     Technique: Brain scanned with axial and sagittal T1W scans, axial T2W scans,  axial diffusion weighted images, and post gadolinium axial and coronal T1W  scans. cc intravenous Magnevist was administered for this exam.     Findings:        1. Diffusion weighted images are without evidence for acute ischemia. No  evidence for old lacunar or other infarct. 2.  No abnormal area of increased signal noted within the brain parenchyma on  long TE images. 3.  No abnormal areas of contrast enhancement. 4.  There is no evidence for mass, mass effect, or midline shift. 5.  No intra- or extra-axial fluid collections are identified. 6.  The ventricles, sulci, and cisterns are unremarkable for age. 7.  The midline structures of the brain to include the pituitary gland (empty  sella is a normal variant), corpus callosum, and brainstem structures are normal  appearing. 8.  Normal major vessel flow voids are present. Dural sinuses are patent. 9.  No Chiari malformation noted. 10. The leptomeninges are unremarkable. 11. The extra-axial craniofacial structures incidentally imaged are normal.        IMPRESSION  Impression:     Essentially unremarkable MRI of the brain with and without contrast.    Assessment and Plan:  Que Wharton is a 65 y.o. right handed female whose history and physical are consistent with vascular headaches. [place] [facility] who has risk factors including gastric bypass,not on dietary supplements, history vitamin b12 and copper deficiencies  Diagnoses and all orders for this visit:    1. Migraine, transformed    2. Migraine with vertigo    3. Other depression    4. Essential hypertension    5.  Neuropathy    Other orders  -     topiramate (TOPAMAX) 25 mg tablet; 2  q am 3 qhs  -     divalproex ER (DEPAKOTE ER) 500 mg ER tablet; 2 at bedtime nightly  -     gabapentin (NEURONTIN) 100 mg capsule; TAKE 2 BY MOUTH THREE TIMES DAILY  -     venlafaxine-SR (EFFEXOR-XR) 75 mg capsule; 1 cap daily      Follow-up Disposition:  Return in about 4 months (around 5/16/2018) for inna De Anda. Reviewed need to avoid step ladders  Reviewed work up need for dietary supplements ,risks and benefits of depakote therapy and topamax therapy  I spent 40 minutes with the patient in face-to-face consultation, of which greater than 50% was spent in counseling and coordination of care as described above.

## 2018-01-16 NOTE — MR AVS SNAPSHOT
[address] [address] 
[phone] Patient: Criselda Flores MRN: [id number] :1955 Visit Information Date & Time Provider Department Dept. Phone Encounter #  
 2018  4:00 PM Carlitos Ma  [facility] [address] [address] [id number] Follow-up Instructions Return in about 4 months (around 2018) for dylon De Andap. Follow-up and Disposition History Your Appointments 3/27/2018  4:00 PM  
Follow Up with Berta Robledo MD  
[facility] ([address] [facility]) Appt Note: migraine headache, neuropathy [address] [id number] [address]  
  
   
 [address] [facility] Upcoming Health Maintenance Date Due  
 PAP AKA CERVICAL CYTOLOGY 1977 ZOSTER VACCINE AGE 60> 2016 FOBT Q 1 YEAR AGE 50-75 2018 BREAST CANCER SCRN MAMMOGRAM 2018 DTaP/Tdap/Td series (3 - Td) 2027 Allergies as of 2018  Review Complete On: 2018 By: Claudette Stover LPN Severity Noted Reaction Type Reactions Other Medication High 03/15/2017    Anaphylaxis ANTI INFLAMATORY Sulfa (Sulfonamide Antibiotics)  2017    Rash Current Immunizations  Reviewed on 2017 Name Date Influenza Vaccine 2017 Tdap 2017 Not reviewed this visit You Were Diagnosed With   
  
 Codes Comments Migraine, transformed    -  Primary ICD-10-CM: U45.099 ICD-9-CM: 346.70 Migraine with vertigo     ICD-10-CM: G43.109 ICD-9-CM: 346.00 Other depression     ICD-10-CM: F32.89 ICD-9-CM: 666 Essential hypertension     ICD-10-CM: I10 
ICD-9-CM: 401.9 Neuropathy     ICD-10-CM: G62.9 ICD-9-CM: 248. 9 Vitals BP Pulse Temp Resp Height(growth percentile) Weight(growth percentile) 120/84 (BP 1 Location: Left arm, BP Patient Position: Sitting) 69 97.6 °F (36.4 °C) (Oral) 20 5' 8\" (1.727 m) 191 lb 12.8 oz (87 kg) SpO2 BMI OB Status Smoking Status 98% 29.16 kg/m2 Hysterectomy Never Smoker Vitals History BMI and BSA Data Body Mass Index Body Surface Area  
 29.16 kg/m 2 2.04 m 2 Preferred Pharmacy Pharmacy Name [facility] Olya Zamarripa [facility] [phone] Your Updated Medication List  
  
   
This list is accurate as of: 1/16/18  4:34 PM.  Always use your most recent med list.  
  
  
  
  
 BENICAR 20 mg tablet Generic drug:  olmesartan Take 1 Tab by mouth daily. cyclobenzaprine 10 mg tablet Commonly known as:  FLEXERIL Take  by mouth three (3) times daily as needed for Muscle Spasm(s). diazePAM 5 mg tablet Commonly known as:  VALIUM Take 1 Tab by mouth daily as needed for Anxiety (vertigo). divalproex  mg ER tablet Commonly known as:  DEPAKOTE ER  
2 at bedtime nightly DYMISTA 137-50 mcg/spray [place] Generic drug:  azelastine-fluticasone  
by Nasal route. EVAMIST 1.53 mg/spray (1.7%) [place] Generic drug:  Estradiol  
by TransDERmal route.  
  
 gabapentin 100 mg capsule Commonly known as:  NEURONTIN  
TAKE 2 BY MOUTH THREE TIMES DAILY HYDROcodone-acetaminophen 7.5-500 mg per tablet Commonly known as:  Jennetta Butler Take  by mouth every four (4) hours as needed for Pain.  
  
 levothyroxine 50 mcg tablet Commonly known as:  SYNTHROID Take  by mouth Daily (before breakfast). magnesium oxide 400 mg tablet Commonly known as:  MAG-OX Take 400 mg by mouth daily. methocarbamol 750 mg tablet Commonly known as:  ROBAXIN  
TAKE 1 BY MOUTH 3 TIMES DAILY Omega-3-DHA-EPA-Fish Oil 1,000 mg (120 mg-180 mg) Cap Take  by mouth. topiramate 25 mg tablet Commonly known as:  TOPAMAX  
2  q am 3 qhs  
  
 venlafaxine-SR 75 mg capsule Commonly known as:  EFFEXOR-XR  
1 cap daily VITAMIN B-1 100 mg tablet Generic drug:  thiamine Take  by mouth daily. VITAMIN B-12 1,000 mcg/mL Drop Generic drug:  cyanocobalamin (vitamin B-12) Take  by mouth. VITAMIN D3 1,000 unit Cap Generic drug:  cholecalciferol Take  by mouth daily. WOMEN'S DAILY MULTIVITAMIN PO Take  by mouth. Prescriptions Sent to Pharmacy Refills  
 topiramate (TOPAMAX) 25 mg tablet 2 Si  q am 3 qhs  
 Class: Normal  
 Pharmacy: [address] [place] [address] filled by Sonya [address] [place] [address] Pky Ph #: [phone]  
 divalproex ER (DEPAKOTE ER) 500 mg ER tablet 2 Si at bedtime nightly Class: Normal  
 Pharmacy: [facility] filled by Zelalem [facility] [address] Ph #: [phone]  
 gabapentin (NEURONTIN) 100 mg capsule 1 Sig: TAKE 2 BY MOUTH THREE TIMES DAILY Class: Normal  
 [facility] by [facility] [address] Ph #: [phone]  
 venlafaxine-SR [facility] 75 mg capsule 1 Si cap daily Class: Normal  
 Pharmacy: [facility] filled by KATHLEEN Hassan - [address] Ph #: [phone] Follow-up Instructions Return in about 4 months (around 2018) for inna De Anda. Patient Instructions Avoid climbing ladders or chairs Please provide this summary of care documentation to your next provider. Your primary care clinician is listed as Berta Rivera. If you have any questions after today's visit, please call [phone].

## 2018-01-31 ENCOUNTER — OFFICE VISIT (OUTPATIENT)
Dept: FAMILY MEDICINE CLINIC | Age: 63
End: 2018-01-31

## 2018-01-31 VITALS
BODY MASS INDEX: 28.64 KG/M2 | RESPIRATION RATE: 18 BRPM | DIASTOLIC BLOOD PRESSURE: 92 MMHG | TEMPERATURE: 95.6 F | HEIGHT: 68 IN | OXYGEN SATURATION: 98 % | HEART RATE: 62 BPM | SYSTOLIC BLOOD PRESSURE: 137 MMHG | WEIGHT: 189 LBS

## 2018-01-31 DIAGNOSIS — I10 ESSENTIAL HYPERTENSION: Primary | ICD-10-CM

## 2018-01-31 DIAGNOSIS — M62.838 MUSCLE SPASM: ICD-10-CM

## 2018-01-31 DIAGNOSIS — G43.109 MIGRAINE WITH VERTIGO: ICD-10-CM

## 2018-01-31 RX ORDER — METHOCARBAMOL 750 MG/1
TABLET, FILM COATED ORAL
Qty: 270 TAB | Refills: 1 | Status: SHIPPED | OUTPATIENT
Start: 2018-01-31 | End: 2018-11-08 | Stop reason: SDUPTHER

## 2018-01-31 RX ORDER — OLMESARTAN MEDOXOMIL 20 MG/1
20 TABLET, FILM COATED ORAL DAILY
Qty: 90 TAB | Refills: 1 | Status: SHIPPED | OUTPATIENT
Start: 2018-01-31 | End: 2018-10-18 | Stop reason: SDUPTHER

## 2018-01-31 RX ORDER — MECLIZINE HYDROCHLORIDE 25 MG/1
25 TABLET ORAL
Qty: 180 TAB | Refills: 0 | Status: SHIPPED | OUTPATIENT
Start: 2018-01-31

## 2018-01-31 NOTE — MR AVS SNAPSHOT
[facility] [address] [facility] 
[phone] Patient: Jonathon Alejandra MRN: [id number] :1955 Visit Information Date & Time Provider Department Dept. Phone Encounter #  
 2018  5:30 PM Berta Fam MD [address] [place] [place] [phone] Follow-up Instructions Return in about 1 month (around 2018), or if symptoms worsen or fail to improve, for vertigo, htn. Your Appointments 3/27/2018  4:00 PM  
Follow Up with Berta Fam MD  
[address] [place] [place] ([facility] Appt Note: migraine headache, neuropathy [address] [facility]  
  
   
 [address] [facility] 2018 11:30 AM  
Follow Up with Sunday Patel NP [address] ([facility]) Appt Note: 4mon f/u; per Dr. Azar brasher w/Alyssa [address] [facility] [id number] [phone]  
  
   
 [facility] [address] Upcoming Health Maintenance Date Due  
 PAP AKA CERVICAL CYTOLOGY 1977 FOBT Q 1 YEAR AGE 50-75 2018 BREAST CANCER SCRN MAMMOGRAM 2018 DTaP/Tdap/Td series (3 - Td) 2027 Allergies as of 2018  Review Complete On: 2018 By: Mis Jones MD  
  
 Severity Noted Reaction Type Reactions Other Medication High 03/15/2017    Anaphylaxis ANTI INFLAMATORY Sulfa (Sulfonamide Antibiotics)  2017    Rash Current Immunizations  Reviewed on 2017 Name Date Influenza Vaccine 2017 Tdap 2017 Not reviewed this visit You Were Diagnosed With   
  
 Codes Comments Essential hypertension    -  Primary ICD-10-CM: I10 
ICD-9-CM: 401.9 Muscle spasm     ICD-10-CM: C51.099 ICD-9-CM: 728.85 Migraine with vertigo     ICD-10-CM: G43.109 ICD-9-CM: 346.00 Vitals BP Pulse Temp Resp Height(growth percentile) Weight(growth percentile) (!) 137/92 (BP 1 Location: Left arm, BP Patient Position: Sitting) 62 95.6 °F (35.3 °C) (Oral) 18 5' 8\" (1.727 m) 189 lb (85.7 kg) SpO2 BMI OB Status Smoking Status 98% 28.74 kg/m2 Hysterectomy Never Smoker BMI and BSA Data Body Mass Index Body Surface Area 28.74 kg/m 2 2.03 m 2 Pref[facility] [phone] Your Updated Medication List  
  
   
This list is accurate as of: 1/31/18  6:05 PM.  Always use your most recent med list.  
  
  
  
  
 BENICAR 20 mg tablet Generic drug:  olmesartan Take 1 Tab by mouth daily. diazePAM 5 mg tablet Commonly known as:  VALIUM Take 1 Tab by mouth daily as needed for Anxiety (vertigo). divalproex  mg ER tablet Commonly known as:  DEPAKOTE ER  
2 at bedtime nightly DYMISTA 137-50 mcg/spray [place] Generic drug:  azelastine-fluticasone  
by Nasal route. EVAMIST 1.53 mg/spray (1.7%) [place] Generic drug:  Estradiol  
by TransDERmal route.  
  
 gabapentin 100 mg capsule Commonly known as:  NEURONTIN  
TAKE 2 BY MOUTH THREE TIMES DAILY HYDROcodone-acetaminophen 7.5-500 mg per tablet Commonly known as:  Ninfa Parisian Take  by mouth every four (4) hours as needed for Pain.  
  
 levothyroxine 50 mcg tablet Commonly known as:  SYNTHROID Take  by mouth Daily (before breakfast). magnesium oxide 400 mg tablet Commonly known as:  MAG-OX Take 400 mg by mouth daily. meclizine 25 mg tablet Commonly known as:  ANTIVERT Take 1 Tab by mouth two (2) times daily as needed. Indications: Vertigo  
  
 methocarbamol 750 mg tablet Commonly known as:  ROBAXIN  
TAKE 1 BY MOUTH 3 TIMES DAILY Omega-3-DHA-EPA-Fish Oil 1,000 mg (120 mg-180 mg) Cap Take  by mouth. topiramate 25 mg tablet Commonly known as:  TOPAMAX  
2  q am 3 qhs  
  
 venlafaxine-SR 75 mg capsule Commonly known as:  EFFEXOR-XR  
1 cap daily VITAMIN B-1 100 mg tablet Generic drug:  thiamine Take  by mouth daily. VITAMIN B-12 1,000 mcg/mL Drop Generic drug:  cyanocobalamin (vitamin B-12) Take  by mouth. VITAMIN D3 1,000 unit Cap Generic drug:  cholecalciferol Take  by mouth daily. WOMEN'S DAILY MULTIVITAMIN PO Take  by mouth. Prescriptions Sent to Pharmacy Refills BENICAR 20 mg tablet 1 Sig: Take 1 Tab by mouth daily. Class: Normal  
 Pharmacy: [facility] filled by KATHLEEN Hassan [address] Ph #: [phone] Route: Oral  
 methocarbamol (ROBAXIN) 750 mg tablet 1 Sig: TAKE 1 BY MOUTH 3 TIMES DAILY Class: Normal  
 Pharmacy: [facility] filled by [address] Ph #: [phone]  
 meclizine (ANTIVERT) 25 mg tablet 0 Sig: Take 1 Tab by mouth two (2) times daily as needed. Indications: Vertigo Class: Normal  
 Pharmacy: [facility] filled by KATHLEEN Hassan - [address] Ph #: [phone] Route: Oral  
  
Follow-up Instructions Return in about 1 month (around 2/28/2018), or if symptoms worsen or fail to improve, for vertigo, htn. Please provide this summary of care documentation to your next provider. Your primary care clinician is listed as Berta Rivera. If you have any questions after today's visit, please call [phone].

## 2018-02-02 ENCOUNTER — TELEPHONE (OUTPATIENT)
Dept: FAMILY MEDICINE CLINIC | Age: 63
End: 2018-02-02

## 2018-02-02 NOTE — TELEPHONE ENCOUNTER
Incoming call from patient, ID verified. Patient is requesting a mammogram order. Her records showed that her last mammogram [place] 6/[place]. Results from this exam is available under media. She also wants Dr Costa to know that she was able to get her Benicar medication from her pharmacy.

## 2018-02-06 DIAGNOSIS — Z12.39 SCREENING FOR BREAST CANCER: Primary | ICD-10-CM

## 2018-02-19 ENCOUNTER — TELEPHONE (OUTPATIENT)
Dept: FAMILY MEDICINE CLINIC | Age: 63
End: 2018-02-19

## 2018-02-20 DIAGNOSIS — N63.0 BREAST LUMP: Primary | ICD-10-CM

## 2018-02-21 ENCOUNTER — TELEPHONE (OUTPATIENT)
Dept: FAMILY MEDICINE CLINIC | Age: 63
End: 2018-02-21

## 2018-02-21 NOTE — TELEPHONE ENCOUNTER
Peyman Yañez from [facility] scheduling called requesting an order for Ultrasound of right breast.

## 2018-02-22 DIAGNOSIS — N63.0 BREAST LUMP: Primary | ICD-10-CM

## 2018-03-01 ENCOUNTER — OFFICE VISIT (OUTPATIENT)
Dept: FAMILY MEDICINE CLINIC | Age: 63
End: 2018-03-01

## 2018-03-01 VITALS
WEIGHT: 190 LBS | RESPIRATION RATE: 20 BRPM | HEART RATE: 62 BPM | BODY MASS INDEX: 28.79 KG/M2 | OXYGEN SATURATION: 99 % | TEMPERATURE: 97.7 F | DIASTOLIC BLOOD PRESSURE: 81 MMHG | SYSTOLIC BLOOD PRESSURE: 112 MMHG | HEIGHT: 68 IN

## 2018-03-01 DIAGNOSIS — M62.838 MUSCLE SPASM: ICD-10-CM

## 2018-03-01 DIAGNOSIS — I10 ESSENTIAL HYPERTENSION: Primary | ICD-10-CM

## 2018-03-01 DIAGNOSIS — G43.109 MIGRAINE WITH VERTIGO: ICD-10-CM

## 2018-03-01 DIAGNOSIS — Z12.11 SCREEN FOR COLON CANCER: ICD-10-CM

## 2018-03-01 NOTE — MR AVS SNAPSHOT
[facility] [address] [facility] 
[phone] Patient: Nathan Truong MRN: [id number] :1955 Visit Information Date & Time Provider Department Dept. Phone Encounter #  
 3/1/2018  5:30 PM MD Angelika Allison [phone] [id number] Follow-up Instructions Return in about 3 months (around 2018), or if symptoms worsen or fail to improve, for htn. Your Appointments 3/27/2018  4:00 PM  
Follow Up with MD Angelika Allison ([facility]) Appt Note: migraine headache, neuropathy [address] [place] [address] 49  
  
   
 [address] [facility] [place] [place] 2018 11:30 AM  
Follow Up with Dayan Baker NP [address] [place] [address] [facility] Appt Note: 4mon f/u; per Dr. Vinayak brasher w/Alyssa Rausch [address] [facility] [id number]  
[phone]  
  
   
 [facility] [id number] Upcoming Health Maintenance Date Due FOBT Q 1 YEAR AGE 50-75 2018 BREAST CANCER SCRN MAMMOGRAM 2018 PAP AKA CERVICAL CYTOLOGY 3/1/2021 DTaP/Tdap/Td series (3 - Td) 2027 Allergies as of 3/1/2018  Review Complete On: 3/1/2018 By: Ml Morales MD  
  
 Severity Noted Reaction Type Reactions Other Medication High 03/15/2017    Anaphylaxis ANTI INFLAMATORY Sulfa (Sulfonamide Antibiotics)  2017    Rash Current Immunizations  Reviewed on 2017 Name Date Influenza Vaccine 2017 Tdap 2017 Not reviewed this visit You Were Diagnosed With   
  
 Codes Comments Essential hypertension    -  Primary ICD-10-CM: I10 
ICD-9-CM: 401.9 Migraine with vertigo     ICD-10-CM: G43.109 ICD-9-CM: 346.00  Screen for colon cancer     ICD-10-CM: Z12.11 
 ICD-9-CM: V76.51 Vitals BP Pulse Temp Resp Height(growth percentile) Weight(growth percentile) 112/81 (BP 1 Location: Left arm, BP Patient Position: Sitting) 62 97.7 °F (36.5 °C) (Oral) 20 5' 8\" (1.727 m) 190 lb (86.2 kg) SpO2 BMI OB Status Smoking Status 99% 28.89 kg/m2 Hysterectomy Never Smoker Vitals History BMI and BSA Data Body Mass Index Body Surface Area  
 28.89 kg/m 2 2.03 m 2 Preferred Pharmacy [facility] [phone] Your Updated Medication List  
  
   
This list is accurate as of 3/1/18  5:33 PM.  Always use your most recent med list.  
  
  
  
  
 BENICAR 20 mg tablet Generic drug:  olmesartan Take 1 Tab by mouth daily. divalproex  mg ER tablet Commonly known as:  DEPAKOTE ER  
2 at bedtime nightly  
  
 gabapentin 100 mg capsule Commonly known as:  NEURONTIN  
TAKE 2 BY MOUTH THREE TIMES DAILY HYDROcodone-acetaminophen 7.5-500 mg per tablet Commonly known as:  Karime  Take  by mouth every four (4) hours as needed for Pain.  
  
 levothyroxine 50 mcg tablet Commonly known as:  SYNTHROID Take  by mouth Daily (before breakfast). magnesium oxide 400 mg tablet Commonly known as:  MAG-OX Take 400 mg by mouth daily. meclizine 25 mg tablet Commonly known as:  ANTIVERT Take 1 Tab by mouth two (2) times daily as needed. Indications: Vertigo  
  
 methocarbamol 750 mg tablet Commonly known as:  ROBAXIN  
TAKE 1 BY MOUTH 3 TIMES DAILY Omega-3-DHA-EPA-Fish Oil 1,000 mg (120 mg-180 mg) Cap Take  by mouth. topiramate 25 mg tablet Commonly known as:  TOPAMAX  
2  q am 3 qhs  
  
 venlafaxine-SR 75 mg capsule Commonly known as:  EFFEXOR-XR  
1 cap daily VITAMIN B-1 100 mg tablet Generic drug:  thiamine Take  by mouth daily. VITAMIN B-12 1,000 mcg/mL Drop Generic drug:  cyanocobalamin (vitamin B-12) Take  by mouth. VITAMIN D3 1,000 unit Cap Generic drug:  cholecalciferol Take  by mouth daily. We Performed the Following REFERRAL TO COLON AND RECTAL SURGERY [REF17 Custom] Comments: For colonoscopy Follow-up Instructions Return in about 3 months (around 6/1/2018), or if symptoms worsen or fail to improve, for htn. To-Do List   
 03/06/2018 1:00 PM  
  Appointment with [facility] RM 1 at [address] [place] [address] ([phone]) OUTSIDE FILMS  - Any outside films related to the study being scheduled should be brought with you on the day of the exam.  If this cannot be done there may be a delay in the reading of the study. MEDICATIONS  - Patient must bring a complete list of all medications currently taking to include prescriptions, over-the-counter meds, herbals, vitamins & any dietary supplements  GENERAL INSTRUCTIONS  - On the day of your exam do not use any bath powder, deodorant or lotions on the armpit area. 03/06/2018 1:45 PM  
  Appointment with [facility] RM 1 at [address] [place] [address] ([phone]) OUTSIDE FILMS  - Any outside films related to the study being scheduled should be brought with you on the day of the exam.  If this cannot be done there may be a delay in the reading of the study. MEDICATIONS  - Patient must bring a complete list of all medications currently taking to include prescriptions, over-the-counter meds, herbals, vitamins & any dietary supplements Referral Information Referral ID Referred By Referred To  
  
 [id number] BARBARA, [address] [facility], [address] [place] [address], [address] Phone: [phone] Fax: [phone] Visits Status Start Date End Date 1 New Request 3/1/18 3/1/19  If your referral has a status of pending review or denied, additional information will be sent to support the outcome of this decision. Please provide this summary of care documentation to your next provider. Your primary care clinician is listed as Berta Rivera. If you have any questions after today's visit, please call [phone].

## 2018-03-01 NOTE — PROGRESS NOTES
Chief Complaint   Patient presents with    Follow Up Chronic Condition     pt here for follow up Chronic condition HTN, Vertigo     1. Have you been to the ER, urgent care clinic since your last visit? Hospitalized since your last visit? No    2. Have you seen or consulted any other health care providers outside of the [address] [place] [address] since your last visit? Include any pap smears or colon screening. No     HPI  Leidy Rojas comes in for follow-up care. Hypertension: Patient taking Benicar. Blood pressure now stable. We will continue with the current treatment plan. Vertigo: Patient has a history of vertigo with migraine headache. She has been seen by the neurologist.  She is on medication. We will continue to defer to neurology for  management of this. Muscle spasms: Patient takes Robaxin. This helps with the muscle spasms. We will continue with the current treatment plan. Health maintenance: Patient does state that she has had total hysterectomy due to menorrhagia and dysmenorrhea. We will set her up for colon cancer screening. Request has been placed for colonoscopy. She is due to have a mammogram done next week. She does have a history of breast lumps. Past Medical History  Past Medical History:   Diagnosis Date    Headache     Hypertension     Scoliosis     Thyroid disease     Vertigo        Surgical History  Past Surgical History:   Procedure Laterality Date    HX BREAST AUGMENTATION  11/28/2006    HX CHOLECYSTECTOMY      HX COLONOSCOPY  08/2014    HX GASTRIC BYPASS  10/25/2001    HX HYSTERECTOMY  03/17/2003        Medications  Current Outpatient Prescriptions   Medication Sig Dispense Refill    BENICAR 20 mg tablet Take 1 Tab by mouth daily. 90 Tab 1    methocarbamol (ROBAXIN) 750 mg tablet TAKE 1 BY MOUTH 3 TIMES DAILY 270 Tab 1    meclizine (ANTIVERT) 25 mg tablet Take 1 Tab by mouth two (2) times daily as needed.  Indications: Vertigo 180 Tab 0    topiramate (TOPAMAX) 25 mg tablet 2  q am 3 qhs 450 Tab 2    divalproex ER (DEPAKOTE ER) 500 mg ER tablet 2 at bedtime nightly 180 Tab 2    gabapentin (NEURONTIN) 100 mg capsule TAKE 2 BY MOUTH THREE TIMES DAILY 540 Cap 1    venlafaxine-SR (EFFEXOR-XR) 75 mg capsule 1 cap daily 90 Cap 1    cyanocobalamin, vitamin B-12, (VITAMIN B-12) 1,000 mcg/mL drop Take  by mouth.  thiamine (VITAMIN B-1) 100 mg tablet Take  by mouth daily.  cholecalciferol (VITAMIN D3) 1,000 unit cap Take  by mouth daily.  Omega-3-DHA-EPA-Fish Oil 1,000 mg (120 mg-180 mg) cap Take  by mouth.  HYDROcodone-acetaminophen (LORTAB) 7.5-500 mg per tablet Take  by mouth every four (4) hours as needed for Pain.  magnesium oxide (MAG-OX) 400 mg tablet Take 400 mg by mouth daily.  levothyroxine (SYNTHROID) 50 mcg tablet Take  by mouth Daily (before breakfast). Allergies  Allergies   Allergen Reactions    Other Medication Anaphylaxis     ANTI INFLAMATORY    Sulfa (Sulfonamide Antibiotics) Rash       Family History  Family History   Problem Relation Age of Onset    Elevated Lipids Mother     Hypertension Brother        Social History  Social History     Social History    Marital status:      Spouse name: N/A    Number of children: N/A    Years of education: N/A     Occupational History    Not on file.      Social History Main Topics    Smoking status: Never Smoker    Smokeless tobacco: Never Used    Alcohol use 1.8 oz/week     3 Glasses of wine per week      Comment: daily wine 4 glasses    Drug use: No    Sexual activity: Yes     Partners: Male     Birth control/ protection: None     Other Topics Concern    Not on file     Social History Narrative       Review of Systems  Review of Systems - History obtained from chart review and the patient  General ROS: negative for - chills or fever  Psychological ROS: negative  Allergy and Immunology ROS: negative  Hematological and Lymphatic ROS: negative  Respiratory ROS: no cough, shortness of breath, or wheezing  Cardiovascular ROS: no chest pain or dyspnea on exertion  Musculoskeletal ROS: positive for - muscle pain  Neurological ROS: History of migraine headaches    Vital Signs  Visit Vitals    /81 (BP 1 Location: Left arm, BP Patient Position: Sitting)    Pulse 62    Temp 97.7 °F (36.5 °C) (Oral)    Resp 20    Ht 5' 8\" (1.727 m)    Wt 190 lb (86.2 kg)    SpO2 99%    BMI 28.89 kg/m2         Physical Exam  Physical Examination: General appearance - oriented to person, place, and time and acyanotic, in no respiratory distress  Mental status - alert, oriented to person, place, and time, affect appropriate to mood  Neck - supple, no significant adenopathy  Chest - clear to auscultation, no wheezes, rales or rhonchi, symmetric air entry, no tachypnea, retractions or cyanosis  Heart - normal rate and regular rhythm, S1 and S2 normal  Neurological - alert, oriented, normal speech, no focal findings or movement disorder noted  Extremities - intact peripheral pulses    Results  Results for orders placed or performed during the hospital encounter of 09/27/17   VALPROIC ACID   Result Value Ref Range    Valproic acid 40 (L) 50 - 095 ug/ml   METABOLIC PANEL, COMPREHENSIVE   Result Value Ref Range    Sodium 141 136 - 145 mmol/L    Potassium 3.7 3.5 - 5.5 mmol/L    Chloride 107 100 - 108 mmol/L    CO2 26 21 - 32 mmol/L    Anion gap 8 3.0 - 18 mmol/L    Glucose 93 74 - 99 mg/dL    BUN 18 7.0 - 18 MG/DL    Creatinine 0.71 0.6 - 1.3 MG/DL    BUN/Creatinine ratio 25 (H) 12 - 20      GFR est AA >60 >60 ml/min/1.73m2    GFR est non-AA >60 >60 ml/min/1.73m2    Calcium 9.2 8.5 - 10.1 MG/DL    Bilirubin, total 0.3 0.2 - 1.0 MG/DL    ALT (SGPT) 56 13 - 56 U/L    AST (SGOT) 48 (H) 15 - 37 U/L    Alk.  phosphatase 96 45 - 117 U/L    Protein, total 7.1 6.4 - 8.2 g/dL    Albumin 4.0 3.4 - 5.0 g/dL    Globulin 3.1 2.0 - 4.0 g/dL    A-G Ratio 1.3 0.8 - 1.7     CBC WITH AUTOMATED DIFF   Result Value Ref Range    WBC 5.9 4.6 - 13.2 K/uL    RBC 4.07 (L) 4.20 - 5.30 M/uL    HGB 13.8 12.0 - 16.0 g/dL    HCT 42.2 35.0 - 45.0 %    .7 (H) 74.0 - 97.0 FL    MCH 33.9 24.0 - 34.0 PG    MCHC 32.7 31.0 - 37.0 g/dL    RDW 13.1 11.6 - 14.5 %    PLATELET 257 890 - 173 K/uL    MPV 10.5 9.2 - 11.8 FL    NEUTROPHILS 50 40 - 73 %    LYMPHOCYTES 36 21 - 52 %    MONOCYTES 10 3 - 10 %    EOSINOPHILS 3 0 - 5 %    BASOPHILS 1 0 - 2 %    ABS. NEUTROPHILS 3.0 1.8 - 8.0 K/UL    ABS. LYMPHOCYTES 2.1 0.9 - 3.6 K/UL    ABS. MONOCYTES 0.6 0.05 - 1.2 K/UL    ABS. EOSINOPHILS 0.2 0.0 - 0.4 K/UL    ABS. BASOPHILS 0.0 0.0 - 0.06 K/UL    DF AUTOMATED     TSH 3RD GENERATION   Result Value Ref Range    TSH 1.83 0.36 - 3.74 uIU/mL   LIPID PANEL   Result Value Ref Range    LIPID PROFILE          Cholesterol, total 235 (H) <200 MG/DL    Triglyceride 174 (H) <150 MG/DL    HDL Cholesterol 81 (H) 40 - 60 MG/DL    LDL, calculated 119.2 (H) 0 - 100 MG/DL    VLDL, calculated 34.8 MG/DL    CHOL/HDL Ratio 2.9 0 - 5.0     BILIRUBIN, DIRECT   Result Value Ref Range    Bilirubin, direct 0.1 0.0 - 0.2 MG/DL       ASSESSMENT and PLAN      ICD-10-CM ICD-9-CM    1. Essential hypertension I10 401.9    2. Migraine with vertigo G43.109 346.00    3. Screen for colon cancer Z12.11 V76.51 REFERRAL TO COLON AND RECTAL SURGERY   4. Muscle spasm M62.838 728.85      reviewed diet, exercise and weight control  reviewed medications and side effects in detail    I have discussed the diagnosis with the patient and the intended plan of care as seen in the above orders. The patient has received an after-visit summary and questions were answered concerning future plans. I have discussed medication, side effects, and warnings with the patient in detail. The patient verbalized understanding and is in agreement with the plan of care. The patient will contact the office with any additional concerns.     Chay Kelly MD

## 2018-04-19 RX ORDER — VENLAFAXINE HYDROCHLORIDE 75 MG/1
CAPSULE, EXTENDED RELEASE ORAL
Qty: 90 CAP | Refills: 1 | Status: SHIPPED | OUTPATIENT
Start: 2018-04-19 | End: 2018-10-15 | Stop reason: SDUPTHER

## 2018-04-19 NOTE — TELEPHONE ENCOUNTER
Clarification request received from [facility] regarding Venlafaxine ER.   Placed in Dr. Jamal Wilde folder @

## 2018-05-07 RX ORDER — DIVALPROEX SODIUM 500 MG/1
TABLET, EXTENDED RELEASE ORAL
Qty: 180 TAB | Refills: 2 | Status: SHIPPED | OUTPATIENT
Start: 2018-05-07

## 2018-05-07 NOTE — TELEPHONE ENCOUNTER
Requested Prescriptions     Pending Prescriptions Disp Refills    divalproex ER (DEPAKOTE ER) 500 mg ER tablet 180 Tab 2     Si at bedtime nightly     Faxed request scanned to chart

## 2018-05-08 ENCOUNTER — OFFICE VISIT (OUTPATIENT)
Dept: FAMILY MEDICINE CLINIC | Age: 63
End: 2018-05-08

## 2018-05-08 VITALS
OXYGEN SATURATION: 96 % | SYSTOLIC BLOOD PRESSURE: 120 MMHG | HEART RATE: 74 BPM | DIASTOLIC BLOOD PRESSURE: 83 MMHG | BODY MASS INDEX: 28.34 KG/M2 | HEIGHT: 68 IN | WEIGHT: 187 LBS | RESPIRATION RATE: 20 BRPM | TEMPERATURE: 98.2 F

## 2018-05-08 DIAGNOSIS — J32.4 PANSINUSITIS, UNSPECIFIED CHRONICITY: Primary | ICD-10-CM

## 2018-05-08 DIAGNOSIS — J30.2 SEASONAL ALLERGIC RHINITIS, UNSPECIFIED TRIGGER: ICD-10-CM

## 2018-05-08 DIAGNOSIS — B37.31 YEAST VAGINITIS: ICD-10-CM

## 2018-05-08 RX ORDER — AMOXICILLIN AND CLAVULANATE POTASSIUM 875; 125 MG/1; MG/1
1 TABLET, FILM COATED ORAL EVERY 12 HOURS
Qty: 20 TAB | Refills: 0 | Status: SHIPPED | OUTPATIENT
Start: 2018-05-08 | End: 2018-05-18

## 2018-05-08 RX ORDER — MONTELUKAST SODIUM 10 MG/1
10 TABLET ORAL DAILY
Qty: 30 TAB | Refills: 0 | Status: SHIPPED | OUTPATIENT
Start: 2018-05-08 | End: 2018-06-04 | Stop reason: SDUPTHER

## 2018-05-08 RX ORDER — FLUCONAZOLE 150 MG/1
150 TABLET ORAL DAILY
Qty: 1 TAB | Refills: 0 | Status: SHIPPED | OUTPATIENT
Start: 2018-05-08 | End: 2018-05-09

## 2018-05-08 NOTE — PATIENT INSTRUCTIONS
Sinusitis: Care Instructions  Your Care Instructions    Sinusitis is an infection of the lining of the sinus cavities in your head. Sinusitis often follows a cold. It causes pain and pressure in your head and face. In most cases, sinusitis gets better on its own in 1 to 2 weeks. But some mild symptoms may last for several weeks. Sometimes antibiotics are needed. Follow-up care is a key part of your treatment and safety. Be sure to make and go to all appointments, and call your doctor if you are having problems. It's also a good idea to know your test results and keep a list of the medicines you take. How can you care for yourself at home? · Take an over-the-counter pain medicine, such as acetaminophen (Tylenol), ibuprofen (Advil, Motrin), or naproxen (Aleve). Read and follow all instructions on the label. · If the doctor prescribed antibiotics, take them as directed. Do not stop taking them just because you feel better. You need to take the full course of antibiotics. · Be careful when taking over-the-counter cold or flu medicines and Tylenol at the same time. Many of these medicines have acetaminophen, which is Tylenol. Read the labels to make sure that you are not taking more than the recommended dose. Too much acetaminophen (Tylenol) can be harmful. · Breathe warm, moist air from a steamy shower, a hot bath, or a sink filled with hot water. Avoid cold, dry air. Using a humidifier in your home may help. Follow the directions for cleaning the machine. · Use saline (saltwater) nasal washes to help keep your nasal passages open and wash out mucus and bacteria. You can buy saline nose drops at a grocery store or drugstore. Or you can make your own at home by adding 1 teaspoon of salt and 1 teaspoon of baking soda to 2 cups of distilled water. If you make your own, fill a bulb syringe with the solution, insert the tip into your nostril, and squeeze gently. Gilmar Clonts your nose.   · Put a hot, wet towel or a warm gel pack on your face 3 or 4 times a day for 5 to 10 minutes each time. · Try a decongestant nasal spray like oxymetazoline (Afrin). Do not use it for more than 3 days in a row. Using it for more than 3 days can make your congestion worse. When should you call for help? Call your doctor now or seek immediate medical care if:  ? · You have new or worse swelling or redness in your face or around your eyes. ? · You have a new or higher fever. ? Watch closely for changes in your health, and be sure to contact your doctor if:  ? · You have new or worse facial pain. ? · The mucus from your nose becomes thicker (like pus) or has new blood in it. ? · You are not getting better as expected. Where can you learn more? Go to http://dustin-lilia.info/. Enter W716 in the search box to learn more about \"Sinusitis: Care Instructions. \"  Current as of: May 12, 2017  Content Version: 11.4  © 1759-4077 [employer]. Care instructions adapted under license by "[employer] (which disclaims liability or warranty for this information). If you have questions about a medical condition or this instruction, always ask your healthcare professional. [address] any warranty or liability for your use of this information.

## 2018-05-08 NOTE — PROGRESS NOTES
Chief Complaint   Patient presents with    Nasal Congestion     pt here with c/o nasal congestion with cough     1. Have you been to the ER, urgent care clinic since your last visit? Hospitalized since your last visit? No    2. Have you seen or consulted any other health care providers outside of the [address] [place] [address] since your last visit? Include any pap smears or colon screening. No     HPI  Luigi Bach comes in for acute care. Patient has nasal congestion, sinus area pressure and postnasal drainage. This has been ongoing for more than 2 weeks. Now has fever, chills and generalized malaise. She is tired most of the time. She had a slight cough that is productive of mucopurulent phlegm. She has tried taking over-the-counter medication with some transient relief. She is traveling out of [facility] in the next 2 days and there would like medication for this. Past Medical History  Past Medical History:   Diagnosis Date    Headache     Hypertension     Scoliosis     Thyroid disease     Vertigo        Surgical History  Past Surgical History:   Procedure Laterality Date    HX BREAST AUGMENTATION  11/28/2006    HX CHOLECYSTECTOMY      HX COLONOSCOPY  08/2014    HX GASTRIC BYPASS  10/25/2001    HX HYSTERECTOMY  03/17/2003        Medications  Current Outpatient Prescriptions   Medication Sig Dispense Refill    montelukast (SINGULAIR) 10 mg tablet Take 1 Tab by mouth daily. Indications: Allergic Rhinitis 30 Tab 0    amoxicillin-clavulanate (AUGMENTIN) 875-125 mg per tablet Take 1 Tab by mouth every twelve (12) hours for 10 days. 20 Tab 0    fluconazole (DIFLUCAN) 150 mg tablet Take 1 Tab by mouth daily for 1 day. FDA advises cautious prescribing of oral fluconazole in pregnancy. 1 Tab 0    divalproex ER (DEPAKOTE ER) 500 mg ER tablet 2 at bedtime nightly 180 Tab 2    venlafaxine-SR (EFFEXOR-XR) 75 mg capsule 1 cap daily 90 Cap 1    BENICAR 20 mg tablet Take 1 Tab by mouth daily.  90 Tab 1  methocarbamol (ROBAXIN) 750 mg tablet TAKE 1 BY MOUTH 3 TIMES DAILY 270 Tab 1    meclizine (ANTIVERT) 25 mg tablet Take 1 Tab by mouth two (2) times daily as needed. Indications: Vertigo 180 Tab 0    topiramate (TOPAMAX) 25 mg tablet 2  q am 3 qhs 450 Tab 2    gabapentin (NEURONTIN) 100 mg capsule TAKE 2 BY MOUTH THREE TIMES DAILY 540 Cap 1    cyanocobalamin, vitamin B-12, (VITAMIN B-12) 1,000 mcg/mL drop Take  by mouth.  magnesium oxide (MAG-OX) 400 mg tablet Take 400 mg by mouth daily.  thiamine (VITAMIN B-1) 100 mg tablet Take  by mouth daily.  levothyroxine (SYNTHROID) 50 mcg tablet Take  by mouth Daily (before breakfast).  cholecalciferol (VITAMIN D3) 1,000 unit cap Take  by mouth daily.  Omega-3-DHA-EPA-Fish Oil 1,000 mg (120 mg-180 mg) cap Take  by mouth.  HYDROcodone-acetaminophen (LORTAB) 7.5-500 mg per tablet Take  by mouth every four (4) hours as needed for Pain. Allergies  Allergies   Allergen Reactions    Other Medication Anaphylaxis     ANTI INFLAMATORY    Sulfa (Sulfonamide Antibiotics) Rash       Family History  Family History   Problem Relation Age of Onset    Elevated Lipids Mother     Hypertension Brother        Social History  Social History     Social History    Marital status:      Spouse name: N/A    Number of children: N/A    Years of education: N/A     Occupational History    Not on file.      Social History Main Topics    Smoking status: Never Smoker    Smokeless tobacco: Never Used    Alcohol use 1.8 oz/week     3 Glasses of wine per week      Comment: daily wine 4 glasses    Drug use: No    Sexual activity: Yes     Partners: Male     Birth control/ protection: None     Other Topics Concern    Not on file     Social History Narrative       Review of Systems  Review of Systems - History obtained from chart review and the patient  General ROS: positive for  - chills, fatigue, fever and malaise  Psychological ROS: negative  ENT ROS: positive for - headaches, hearing change, nasal congestion, nasal discharge, sinus pain, sneezing and tinnitus  Allergy and Immunology ROS: positive for - nasal congestion, postnasal drip and seasonal allergies  Hematological and Lymphatic ROS: negative  Endocrine ROS: negative  Respiratory ROS: positive for - cough  negative for - pleuritic pain or shortness of breath  Cardiovascular ROS: negative  Gastrointestinal ROS: no abdominal pain, change in bowel habits, or black or bloody stools  Genito-Urinary ROS: negative  Musculoskeletal ROS: positive for - joint pain and muscle pain  Neurological ROS: no TIA or stroke symptoms    Vital Signs  Visit Vitals    /83 (BP 1 Location: Left arm, BP Patient Position: Sitting)    Pulse 74    Temp 98.2 °F (36.8 °C) (Oral)    Resp 20    Ht 5' 8\" (1.727 m)    Wt 187 lb (84.8 kg)    SpO2 96%    BMI 28.43 kg/m2         Physical Exam  Physical Examination: General appearance - oriented to person, place, and time, acyanotic, in no respiratory distress and well hydrated  Mental status - alert, oriented to person, place, and time, affect appropriate to mood  Eyes - pupils equal and reactive, extraocular eye movements intact, sclera anicteric  Ears - right TM red, dull, bulging, left TM red, dull, bulging  Nose - mucosal congestion, mucosal erythema, purulent rhinorrhea and sinus tenderness noted frontal and maxillary  Mouth - erythematous  Lymphatics - no palpable lymphadenopathy  Chest - decreased air entry noted bilateral bases  Heart - normal rate and regular rhythm, S1 and S2 normal  Neurological - motor and sensory grossly normal bilaterally  Musculoskeletal - osteoarthritic changes noted in both hands  Extremities - intact peripheral pulses    Results  Results for orders placed or performed during the hospital encounter of 09/27/17   VALPROIC ACID   Result Value Ref Range    Valproic acid 40 (L) 50 - 146 ug/ml   METABOLIC PANEL, COMPREHENSIVE   Result Value Ref Range    Sodium 141 136 - 145 mmol/L    Potassium 3.7 3.5 - 5.5 mmol/L    Chloride 107 100 - 108 mmol/L    CO2 26 21 - 32 mmol/L    Anion gap 8 3.0 - 18 mmol/L    Glucose 93 74 - 99 mg/dL    BUN 18 7.0 - 18 MG/DL    Creatinine 0.71 0.6 - 1.3 MG/DL    BUN/Creatinine ratio 25 (H) 12 - 20      GFR est AA >60 >60 ml/min/1.73m2    GFR est non-AA >60 >60 ml/min/1.73m2    Calcium 9.2 8.5 - 10.1 MG/DL    Bilirubin, total 0.3 0.2 - 1.0 MG/DL    ALT (SGPT) 56 13 - 56 U/L    AST (SGOT) 48 (H) 15 - 37 U/L    Alk. phosphatase 96 45 - 117 U/L    Protein, total 7.1 6.4 - 8.2 g/dL    Albumin 4.0 3.4 - 5.0 g/dL    Globulin 3.1 2.0 - 4.0 g/dL    A-G Ratio 1.3 0.8 - 1.7     CBC WITH AUTOMATED DIFF   Result Value Ref Range    WBC 5.9 4.6 - 13.2 K/uL    RBC 4.07 (L) 4.20 - 5.30 M/uL    HGB 13.8 12.0 - 16.0 g/dL    HCT 42.2 35.0 - 45.0 %    .7 (H) 74.0 - 97.0 FL    MCH 33.9 24.0 - 34.0 PG    MCHC 32.7 31.0 - 37.0 g/dL    RDW 13.1 11.6 - 14.5 %    PLATELET 739 899 - 759 K/uL    MPV 10.5 9.2 - 11.8 FL    NEUTROPHILS 50 40 - 73 %    LYMPHOCYTES 36 21 - 52 %    MONOCYTES 10 3 - 10 %    EOSINOPHILS 3 0 - 5 %    BASOPHILS 1 0 - 2 %    ABS. NEUTROPHILS 3.0 1.8 - 8.0 K/UL    ABS. LYMPHOCYTES 2.1 0.9 - 3.6 K/UL    ABS. MONOCYTES 0.6 0.05 - 1.2 K/UL    ABS. EOSINOPHILS 0.2 0.0 - 0.4 K/UL    ABS. BASOPHILS 0.0 0.0 - 0.06 K/UL    DF AUTOMATED     TSH 3RD GENERATION   Result Value Ref Range    TSH 1.83 0.36 - 3.74 uIU/mL   LIPID PANEL   Result Value Ref Range    LIPID PROFILE          Cholesterol, total 235 (H) <200 MG/DL    Triglyceride 174 (H) <150 MG/DL    HDL Cholesterol 81 (H) 40 - 60 MG/DL    LDL, calculated 119.2 (H) 0 - 100 MG/DL    VLDL, calculated 34.8 MG/DL    CHOL/HDL Ratio 2.9 0 - 5.0     BILIRUBIN, DIRECT   Result Value Ref Range    Bilirubin, direct 0.1 0.0 - 0.2 MG/DL       ASSESSMENT and PLAN    ICD-10-CM ICD-9-CM    1.  Pansinusitis, unspecified chronicity J32.4 473.8 amoxicillin-clavulanate (AUGMENTIN) 875-125 mg per tablet 2. Yeast vaginitis B37.3 112.1 fluconazole (DIFLUCAN) 150 mg tablet   3. Seasonal allergic rhinitis, unspecified trigger J30.2 477.9 montelukast (SINGULAIR) 10 mg tablet     reviewed diet, exercise and weight control  reviewed medications and side effects in detail    I have discussed the diagnosis with the patient and the intended plan of care as seen in the above orders. The patient has received an after-visit summary and questions were answered concerning future plans. I have discussed medication, side effects, and warnings with the patient in detail. The patient verbalized understanding and is in agreement with the plan of care. The patient will contact the office with any additional concerns.     Clarissa Gutierrez MD

## 2018-05-08 NOTE — MR AVS SNAPSHOT
[facility] 107 200 [facility] 
[phone] Patient: Payal Singer MRN: [id number] :1955 Visit Information Date & Time Provider Department Dept. Phone Encounter #  
 2018  4:45 PM Berta Wheat, [address] [phone] [id number] Follow-up Instructions Return if symptoms worsen or fail to improve. Your Appointments 2018 11:30 AM  
Follow Up with Verenice Magana NP [address] ([address] [place] [address]) Appt Note: 4mon f/u; per Dr. Jeane brasher w/Alyssa Rausch [address] [facility] [id number] [phone]  
  
   
 [facility] [id number]  
  
    
 2018  5:15 PM  
Follow Up with Berta Wheat MD  
[facility] ([address] [place] [address]) Appt Note: htn  
 148 [facility] 107 [address] [address]  
  
   
 [address] Upcoming Health Maintenance Date Due FOBT Q 1 YEAR AGE 50-75 2018 BREAST CANCER SCRN MAMMOGRAM 2018 Influenza Age 5 to Adult 2018 PAP AKA CERVICAL CYTOLOGY 3/1/2021 DTaP/Tdap/Td series (3 - Td) 2027 Allergies as of 2018  Review Complete On: 2018 By: Codie Gaytan LPN Severity Noted Reaction Type Reactions Other Medication High 03/15/2017    Anaphylaxis ANTI INFLAMATORY Sulfa (Sulfonamide Antibiotics)  2017    Rash Current Immunizations  Reviewed on 2017 Name Date Influenza Vaccine 2017 Tdap 2017 Not reviewed this visit You Were Diagnosed With   
  
 Codes Comments Pansinusitis, unspecified chronicity    -  Primary ICD-10-CM: J32.4 ICD-9-CM: 473.8 Yeast vaginitis     ICD-10-CM: B37.3 ICD-9-CM: 112.1 Seasonal allergic rhinitis, unspecified trigger     ICD-10-CM: J30.2 ICD-9-CM: 477.9 Vitals BP Pulse Temp Resp Height(growth percentile) Weight(growth percentile) 120/83 (BP 1 Location: Left arm, BP Patient Position: Sitting) 74 98.2 °F (36.8 °C) (Oral) 20 5' 8\" (1.727 m) 187 lb (84.8 kg) SpO2 BMI OB Status Smoking Status 96% 28.43 kg/m2 Hysterectomy Never Smoker Vitals History BMI and BSA Data Body Mass Index Body Surface Area  
 28.43 kg/m 2 2.02 m 2 Preferred Pharmacy Pharmacy Name Phone [facility] [address] [facility] AT [facility] [phone] Your Updated Medication List  
  
   
This list is accurate as of 5/8/18  5:15 PM.  Always use your most recent med list.  
  
  
  
  
 amoxicillin-clavulanate 875-125 mg per tablet Commonly known as:  AUGMENTIN Take 1 Tab by mouth every twelve (12) hours for 10 days. BENICAR 20 mg tablet Generic drug:  olmesartan Take 1 Tab by mouth daily. divalproex  mg ER tablet Commonly known as:  DEPAKOTE ER  
2 at bedtime nightly  
  
 fluconazole 150 mg tablet Commonly known as:  DIFLUCAN Take 1 Tab by mouth daily for 1 day. FDA advises cautious prescribing of oral fluconazole in pregnancy. gabapentin 100 mg capsule Commonly known as:  NEURONTIN  
TAKE 2 BY MOUTH THREE TIMES DAILY HYDROcodone-acetaminophen 7.5-500 mg per tablet Commonly known as:  Denis Shank Take  by mouth every four (4) hours as needed for Pain.  
  
 levothyroxine 50 mcg tablet Commonly known as:  SYNTHROID Take  by mouth Daily (before breakfast). magnesium oxide 400 mg tablet Commonly known as:  MAG-OX Take 400 mg by mouth daily. meclizine 25 mg tablet Commonly known as:  ANTIVERT Take 1 Tab by mouth two (2) times daily as needed. Indications: Vertigo  
  
 methocarbamol 750 mg tablet Commonly known as:  ROBAXIN  
TAKE 1 BY MOUTH 3 TIMES DAILY  
  
 montelukast 10 mg tablet Commonly known as:  SINGULAIR Take 1 Tab by mouth daily. Indications: Allergic Rhinitis  
  
 omega 3-DHA-EPA-fish oil 1,000 mg (120 mg-180 mg) capsule Take  by mouth. topiramate 25 mg tablet Commonly known as:  TOPAMAX  
2  q am 3 qhs  
  
 venlafaxine-SR 75 mg capsule Commonly known as:  EFFEXOR-XR  
1 cap daily VITAMIN B-1 100 mg tablet Generic drug:  thiamine Take  by mouth daily. VITAMIN B-12 1,000 mcg/mL Drop Generic drug:  cyanocobalamin (vitamin B-12) Take  by mouth. VITAMIN D3 1,000 unit Cap Generic drug:  cholecalciferol Take  by mouth daily. Prescriptions Sent to Pharmacy Refills  
 montelukast (SINGULAIR) 10 mg tablet 0 Sig: Take 1 Tab by mouth daily. Indications: Allergic Rhinitis Class: Normal  
 Pharmacy: [facility] [address] [place] [address] Ph #: [phone] Route: Oral  
 amoxicillin-clavulanate (AUGMENTIN) 875-125 mg per tablet 0 Sig: Take 1 Tab by mouth every twelve (12) hours for 10 days. Class: Normal  
 Pharmacy: [facility] [address] [place] [address] Ph #: [phone] Route: Oral  
 fluconazole (DIFLUCAN) 150 mg tablet 0 Sig: Take 1 Tab by mouth daily for 1 day. FDA advises cautious prescribing of oral fluconazole in pregnancy. Class: Normal  
 Pharmacy: [facility] [address] [place] [address] Ph #: [phone] Route: Oral  
  
Follow-up Instructions Return if symptoms worsen or fail to improve. Patient Instructions Sinusitis: Care Instructions Your Care Instructions Sinusitis is an infection of the lining of the sinus cavities in your head. Sinusitis often follows a cold. It causes pain and pressure in your head and face. In most cases, sinusitis gets better on its own in 1 to 2 weeks.  But some mild symptoms may last for several weeks. Sometimes antibiotics are needed. Follow-up care is a key part of your treatment and safety. Be sure to make and go to all appointments, and call your doctor if you are having problems. It's also a good idea to know your test results and keep a list of the medicines you take. How can you care for yourself at home? · Take an over-the-counter pain medicine, such as acetaminophen (Tylenol), ibuprofen (Advil, Motrin), or naproxen (Aleve). Read and follow all instructions on the label. · If the doctor prescribed antibiotics, take them as directed. Do not stop taking them just because you feel better. You need to take the full course of antibiotics. · Be careful when taking over-the-counter cold or flu medicines and Tylenol at the same time. Many of these medicines have acetaminophen, which is Tylenol. Read the labels to make sure that you are not taking more than the recommended dose. Too much acetaminophen (Tylenol) can be harmful. · Breathe warm, moist air from a steamy shower, a hot bath, or a sink filled with hot water. Avoid cold, dry air. Using a humidifier in your home may help. Follow the directions for cleaning the machine. · Use saline (saltwater) nasal washes to help keep your nasal passages open and wash out mucus and bacteria. You can buy saline nose drops at a grocery store or drugstore. Or you can make your own at home by adding 1 teaspoon of salt and 1 teaspoon of baking soda to 2 cups of distilled water. If you make your own, fill a bulb syringe with the solution, insert the tip into your nostril, and squeeze gently. Theodor Eisenmenger your nose. · Put a hot, wet towel or a warm gel pack on your face 3 or 4 times a day for 5 to 10 minutes each time. · Try a decongestant nasal spray like oxymetazoline (Afrin). Do not use it for more than 3 days in a row. Using it for more than 3 days can make your congestion worse. When should you call for help? Call your doctor now or seek immediate medical care if: 
? · You have new or worse swelling or redness in your face or around your eyes. ? · You have a new or higher fever. ? Watch closely for changes in your health, and be sure to contact your doctor if: 
? · You have new or worse facial pain. ? · The mucus from your nose becomes thicker (like pus) or has new blood in it. ? · You are not getting better as expected. Where can you learn more? Go to http://dustin-lilia.info/. Enter H683 in the search box to learn more about \"Sinusitis: Care Instructions. \" Current as of: May 12, 2017 Content Version: 11.4 © 6426-2638 [employer]. Care instructions adapted under license by [employer] (which disclaims liability or warranty for this information). If you have questions about a medical condition or this instruction, always ask your healthcare professional. [place] [address] any warranty or liability for your use of this information. Introducing [facility]! Dear Leopoldo Dubois: Thank you for requesting a [employer] account. Our records indicate that you have previously registered for a [employer] account but its currently inactive. Please call our [employer] support line at [phone]. Additional Information If you have questions, please visit the Frequently Asked Questions section of the [employer] website at [email or web address] Remember, MyChart is NOT to be used for urgent needs. For medical emergencies, dial 911. Now available from your iPhone and Android! Please provide this summary of care documentation to your next provider. Your primary care clinician is listed as Berta Rivera. If you have any questions after today's visit, please call [phone].

## 2018-05-29 ENCOUNTER — TELEPHONE (OUTPATIENT)
Dept: NEUROLOGY | Age: 63
End: 2018-05-29

## 2018-05-29 NOTE — TELEPHONE ENCOUNTER
Health guide rec'd for Venlafaxine rec'd and placed in folder at [facility]

## 2018-06-01 NOTE — TELEPHONE ENCOUNTER
Provider reviewed information received from [facility] but found decided it was not needed. Given to Gloria Hicks to scan to connect care.

## 2018-06-04 DIAGNOSIS — J30.2 SEASONAL ALLERGIC RHINITIS, UNSPECIFIED TRIGGER: ICD-10-CM

## 2018-06-04 RX ORDER — MONTELUKAST SODIUM 10 MG/1
TABLET ORAL
Qty: 90 TAB | Refills: 0 | Status: SHIPPED | OUTPATIENT
Start: 2018-06-04 | End: 2018-09-01 | Stop reason: SDUPTHER

## 2018-06-05 ENCOUNTER — OFFICE VISIT (OUTPATIENT)
Dept: FAMILY MEDICINE CLINIC | Age: 63
End: 2018-06-05

## 2018-06-05 VITALS
HEIGHT: 68 IN | OXYGEN SATURATION: 99 % | TEMPERATURE: 97.1 F | WEIGHT: 186 LBS | HEART RATE: 61 BPM | RESPIRATION RATE: 18 BRPM | DIASTOLIC BLOOD PRESSURE: 61 MMHG | SYSTOLIC BLOOD PRESSURE: 100 MMHG | BODY MASS INDEX: 28.19 KG/M2

## 2018-06-05 DIAGNOSIS — E03.9 HYPOTHYROIDISM, UNSPECIFIED TYPE: ICD-10-CM

## 2018-06-05 DIAGNOSIS — I10 ESSENTIAL HYPERTENSION: Primary | ICD-10-CM

## 2018-06-05 NOTE — MR AVS SNAPSHOT
[facility] 107 200 [facility] 
[phone] Patient: Lor Jamison MRN: [id number] :1955 Visit Information Date & Time Provider Department Dept. Phone Encounter #  
 2018  5:15 PM Berta Delatorre MD [address] [facility] [place] [address] [id number] Follow-up Instructions Return in about 3 months (around 2018), or if symptoms worsen or fail to improve, for htn, hypothyroidism. Your Appointments 2018  2:15 PM  
Follow Up with Alvaro Muniz NP [address] ([facility]) Appt Note: 4mon f/u; per Dr. Ortiz brasher w/Alyssa; pt r/s to this date & time [place] [place] [id number] [phone]  
  
   
 [place] [address] Upcoming Health Maintenance Date Due FOBT Q 1 YEAR AGE 50-75 2018 BREAST CANCER SCRN MAMMOGRAM 2018 Influenza Age 5 to Adult 2018 PAP AKA CERVICAL CYTOLOGY 3/1/2021 DTaP/Tdap/Td series (3 - Td) 2027 Allergies as of 2018  Review Complete On: 2018 By: Tom Thacker MD  
  
 Severity Noted Reaction Type Reactions Nsaids (Non-steroidal Anti-inflammatory Drug) High   Rash Other Medication High 03/15/2017    Anaphylaxis ANTI INFLAMATORY Sulfa (Sulfonamide Antibiotics) Medium 2017    Rash Current Immunizations  Reviewed on 2017 Name Date Influenza Vaccine 2017 Tdap 2017 Not reviewed this visit You Were Diagnosed With   
  
 Codes Comments Essential hypertension    -  Primary ICD-10-CM: I10 
ICD-9-CM: 401.9 Hypothyroidism, unspecified type     ICD-10-CM: E03.9 ICD-9-CM: 074. 9 Vitals BP Pulse Temp Resp Height(growth percentile) Weight(growth percentile)  100/61 (BP 1 Location: Left arm, BP Patient Position: Sitting) 61 97.1 °F (36.2 °C) (Oral) 18 5' 8\" (1.727 m) 186 lb (84.4 kg) SpO2 BMI OB Status Smoking Status 99% 28.28 kg/m2 Hysterectomy Never Smoker BMI and BSA Data Body Mass Index Body Surface Area  
 28.28 kg/m 2 2.01 m 2 Preferred Pharmacy Pharmacy Name Phone [facility] [address] [place] [address] AT [facility] [phone] Your Updated Medication List  
  
   
This list is accurate as of 6/5/18  5:36 PM.  Always use your most recent med list.  
  
  
  
  
 BENICAR 20 mg tablet Generic drug:  olmesartan Take 1 Tab by mouth daily. divalproex  mg ER tablet Commonly known as:  DEPAKOTE ER  
2 at bedtime nightly  
  
 gabapentin 100 mg capsule Commonly known as:  NEURONTIN  
TAKE 2 BY MOUTH THREE TIMES DAILY HYDROcodone-acetaminophen 7.5-500 mg per tablet Commonly known as:  [address] Take  by mouth every four (4) hours as needed for Pain.  
  
 levothyroxine 50 mcg tablet Commonly known as:  SYNTHROID Take  by mouth Daily (before breakfast). magnesium oxide 400 mg tablet Commonly known as:  MAG-OX Take 400 mg by mouth daily. meclizine 25 mg tablet Commonly known as:  ANTIVERT Take 1 Tab by mouth two (2) times daily as needed. Indications: Vertigo  
  
 methocarbamol 750 mg tablet Commonly known as:  ROBAXIN  
TAKE 1 BY MOUTH 3 TIMES DAILY  
  
 montelukast 10 mg tablet Commonly known as:  SINGULAIR  
TAKE 1 TABLET BY MOUTH DAILY FOR ALLERGIC RHINITIS  
  
 omega 3-DHA-EPA-fish oil 1,000 mg (120 mg-180 mg) capsule Take  by mouth. topiramate 25 mg tablet Commonly known as:  TOPAMAX  
2  q am 3 qhs  
  
 venlafaxine-SR 75 mg capsule Commonly known as:  EFFEXOR-XR  
1 cap daily VITAMIN B-1 100 mg tablet Generic drug:  thiamine Take  by mouth daily. VITAMIN B-12 1,000 mcg/mL Drop Generic drug:  cyanocobalamin (vitamin B-12) Take  by mouth. VITAMIN D3 1,000 unit Cap Generic drug:  cholecalciferol Take  by mouth daily. Follow-up Instructions Return in about 3 months (around 9/5/2018), or if symptoms worsen or fail to improve, for htn, hypothyroidism. To-Do List   
 06/05/2018 Lab:  CBC WITH AUTOMATED DIFF   
  
 06/05/2018 Lab:  LIPID PANEL   
  
 06/05/2018 Lab:  METABOLIC PANEL, COMPREHENSIVE   
  
 06/05/2018 Lab:  TSH 3RD GENERATION Introducing [facility]! Makimanuel Benitez introduces [facility] patient portal. Now you can access parts of your medical record, email your doctor's office, and request medication refills online. 1. In your internet browser, go to [email or web address] 2. Click on the First Time User? Click Here link in the Sign In box. You will see the New Member Sign Up page. 3. Enter your [facility] Access Code exactly as it appears below. You will not need to use this code after youve completed the sign-up process. If you do not sign up before the expiration date, you must request a new code. · [facility] Access Code: [email or web address] Expires: 8/6/2018  5:19 PM 
 
4. Enter the last four digits of your Social Security Number (xxxx) and Date of Birth (mm/dd/yyyy) as indicated and click Submit. You will be taken to the next sign-up page. 5. Create a [facility] ID. This will be your [facility] login ID and cannot be changed, so think of one that is secure and easy to remember. 6. Create a [facility] password. You can change your password at any time. 7. Enter your Password Reset Question and Answer. This can be used at a later time if you forget your password. 8. Enter your e-mail address. You will receive e-mail notification when new information is available in [address] 9. Click Sign Up. You can now view and download portions of your medical record. 10. Click the Download Summary menu link to download a portable copy of your medical information. If you have questions, please visit the Frequently Asked Questions section of the [employer] website. Remember, [employer] is NOT to be used for urgent needs. For medical emergencies, dial 911. Now available from your iPhone and Android! Please provide this summary of care documentation to your next provider. Your primary care clinician is listed as Berta Rivera. If you have any questions after today's visit, please call [phone].

## 2018-06-05 NOTE — PROGRESS NOTES
Chief Complaint   Patient presents with    Hypertension     1. Have you been to the ER, urgent care clinic since your last visit? Hospitalized since your last visit? No    2. Have you seen or consulted any other health care providers outside of the [address] [place] [address] since your last visit? Include any pap smears or colon screening. No     HPI  Alejandra Coronado comes in for follow-up care. Hypertension: Patient comes in for follow-up of hypertension. Blood pressure is stable today. She is taking her medication. She is on Benicar daily. We will continue with current treatment plan. She will come in for labs later when she is fasting. Will do CBC, CMP and lipid panel. Hypothyroidism: Patient is on thyroid replacement therapy. Will check TSH when she comes in for blood work. We will continue with the current treatment plan. Health maintenance: Patient will set up mammogram and colonoscopy. States that she has the numbers to call and she will set this up by herself. I will follow-up on that at next visit. Past Medical History  Past Medical History:   Diagnosis Date    Headache     Hypertension     Scoliosis     Thyroid disease     Vertigo        Surgical History  Past Surgical History:   Procedure Laterality Date    HX BREAST AUGMENTATION  11/28/2006    HX CHOLECYSTECTOMY      HX COLONOSCOPY  08/2014    HX GASTRIC BYPASS  10/25/2001    HX HYSTERECTOMY  03/17/2003        Medications  Current Outpatient Prescriptions   Medication Sig Dispense Refill    montelukast (SINGULAIR) 10 mg tablet TAKE 1 TABLET BY MOUTH DAILY FOR ALLERGIC RHINITIS 90 Tab 0    divalproex ER (DEPAKOTE ER) 500 mg ER tablet 2 at bedtime nightly 180 Tab 2    venlafaxine-SR (EFFEXOR-XR) 75 mg capsule 1 cap daily 90 Cap 1    BENICAR 20 mg tablet Take 1 Tab by mouth daily.  90 Tab 1    methocarbamol (ROBAXIN) 750 mg tablet TAKE 1 BY MOUTH 3 TIMES DAILY 270 Tab 1    meclizine (ANTIVERT) 25 mg tablet Take 1 Tab by mouth two (2) times daily as needed. Indications: Vertigo 180 Tab 0    topiramate (TOPAMAX) 25 mg tablet 2  q am 3 qhs 450 Tab 2    gabapentin (NEURONTIN) 100 mg capsule TAKE 2 BY MOUTH THREE TIMES DAILY 540 Cap 1    cyanocobalamin, vitamin B-12, (VITAMIN B-12) 1,000 mcg/mL drop Take  by mouth.  magnesium oxide (MAG-OX) 400 mg tablet Take 400 mg by mouth daily.  thiamine (VITAMIN B-1) 100 mg tablet Take  by mouth daily.  levothyroxine (SYNTHROID) 50 mcg tablet Take  by mouth Daily (before breakfast).  cholecalciferol (VITAMIN D3) 1,000 unit cap Take  by mouth daily.  Omega-3-DHA-EPA-Fish Oil 1,000 mg (120 mg-180 mg) cap Take  by mouth.  HYDROcodone-acetaminophen (LORTAB) 7.5-500 mg per tablet Take  by mouth every four (4) hours as needed for Pain. Allergies  Allergies   Allergen Reactions    Nsaids (Non-Steroidal Anti-Inflammatory Drug) Rash    Other Medication Anaphylaxis     ANTI INFLAMATORY    Sulfa (Sulfonamide Antibiotics) Rash       Family History  Family History   Problem Relation Age of Onset    Elevated Lipids Mother     Hypertension Brother        Social History  Social History     Social History    Marital status:      Spouse name: N/A    Number of children: N/A    Years of education: N/A     Occupational History    Not on file. Social History Main Topics    Smoking status: Never Smoker    Smokeless tobacco: Never Used    Alcohol use 1.8 oz/week     3 Glasses of wine per week      Comment: daily wine 4 glasses    Drug use: No    Sexual activity: Yes     Partners: Male     Birth control/ protection: None     Other Topics Concern    Not on file     Social History Narrative       Review of Systems  Review of Systems - Negative except as noted above in the HPI.     Vital Signs  Visit Vitals    /61 (BP 1 Location: Left arm, BP Patient Position: Sitting)    Pulse 61    Temp 97.1 °F (36.2 °C) (Oral)    Resp 18    Ht 5' 8\" (1.727 m)    Wt 186 lb (84.4 kg)    SpO2 99%    BMI 28.28 kg/m2         Physical Exam  Physical Examination: General appearance - oriented to person, place, and time and acyanotic, in no respiratory distress  Mental status - alert, oriented to person, place, and time, affect appropriate to mood  Neck - Supple  Lymphatics - no palpable lymphadenopathy  Chest - clear to auscultation, no wheezes, rales or rhonchi, symmetric air entry  Heart - S1 and S2 normal  Back exam - limited range of motion, has scoliosis  Neurological - motor and sensory grossly normal bilaterally  Musculoskeletal - osteoarthritic changes noted in both hands  Extremities - no pedal edema noted, intact peripheral pulses    Results  Results for orders placed or performed during the hospital encounter of 09/27/17   VALPROIC ACID   Result Value Ref Range    Valproic acid 40 (L) 50 - 622 ug/ml   METABOLIC PANEL, COMPREHENSIVE   Result Value Ref Range    Sodium 141 136 - 145 mmol/L    Potassium 3.7 3.5 - 5.5 mmol/L    Chloride 107 100 - 108 mmol/L    CO2 26 21 - 32 mmol/L    Anion gap 8 3.0 - 18 mmol/L    Glucose 93 74 - 99 mg/dL    BUN 18 7.0 - 18 MG/DL    Creatinine 0.71 0.6 - 1.3 MG/DL    BUN/Creatinine ratio 25 (H) 12 - 20      GFR est AA >60 >60 ml/min/1.73m2    GFR est non-AA >60 >60 ml/min/1.73m2    Calcium 9.2 8.5 - 10.1 MG/DL    Bilirubin, total 0.3 0.2 - 1.0 MG/DL    ALT (SGPT) 56 13 - 56 U/L    AST (SGOT) 48 (H) 15 - 37 U/L    Alk.  phosphatase 96 45 - 117 U/L    Protein, total 7.1 6.4 - 8.2 g/dL    Albumin 4.0 3.4 - 5.0 g/dL    Globulin 3.1 2.0 - 4.0 g/dL    A-G Ratio 1.3 0.8 - 1.7     CBC WITH AUTOMATED DIFF   Result Value Ref Range    WBC 5.9 4.6 - 13.2 K/uL    RBC 4.07 (L) 4.20 - 5.30 M/uL    HGB 13.8 12.0 - 16.0 g/dL    HCT 42.2 35.0 - 45.0 %    .7 (H) 74.0 - 97.0 FL    MCH 33.9 24.0 - 34.0 PG    MCHC 32.7 31.0 - 37.0 g/dL    RDW 13.1 11.6 - 14.5 %    PLATELET 927 130 - 146 K/uL    MPV 10.5 9.2 - 11.8 FL    NEUTROPHILS 50 40 - 73 %    LYMPHOCYTES 36 21 - 52 %    MONOCYTES 10 3 - 10 %    EOSINOPHILS 3 0 - 5 %    BASOPHILS 1 0 - 2 %    ABS. NEUTROPHILS 3.0 1.8 - 8.0 K/UL    ABS. LYMPHOCYTES 2.1 0.9 - 3.6 K/UL    ABS. MONOCYTES 0.6 0.05 - 1.2 K/UL    ABS. EOSINOPHILS 0.2 0.0 - 0.4 K/UL    ABS. BASOPHILS 0.0 0.0 - 0.06 K/UL    DF AUTOMATED     TSH 3RD GENERATION   Result Value Ref Range    TSH 1.83 0.36 - 3.74 uIU/mL   LIPID PANEL   Result Value Ref Range    LIPID PROFILE          Cholesterol, total 235 (H) <200 MG/DL    Triglyceride 174 (H) <150 MG/DL    HDL Cholesterol 81 (H) 40 - 60 MG/DL    LDL, calculated 119.2 (H) 0 - 100 MG/DL    VLDL, calculated 34.8 MG/DL    CHOL/HDL Ratio 2.9 0 - 5.0     BILIRUBIN, DIRECT   Result Value Ref Range    Bilirubin, direct 0.1 0.0 - 0.2 MG/DL       ASSESSMENT and PLAN    ICD-10-CM ICD-9-CM    1. Essential hypertension I10 401.9 CBC WITH AUTOMATED DIFF      LIPID PANEL      METABOLIC PANEL, COMPREHENSIVE   2. Hypothyroidism, unspecified type E03.9 244.9 TSH 3RD GENERATION     lab results and schedule of future lab studies reviewed with patient  reviewed diet, exercise and weight control  reviewed medications and side effects in detail    I have discussed the diagnosis with the patient and the intended plan of care as seen in the above orders. The patient has received an after-visit summary and questions were answered concerning future plans. I have discussed medication, side effects, and warnings with the patient in detail. The patient verbalized understanding and is in agreement with the plan of care. The patient will contact the office with any additional concerns.     Jevon Gil MD

## 2018-06-06 ENCOUNTER — HOSPITAL ENCOUNTER (OUTPATIENT)
Dept: LAB | Age: 63
Discharge: HOME | End: 2018-06-06
Payer: COMMERCIAL

## 2018-06-06 ENCOUNTER — LAB ONLY (OUTPATIENT)
Dept: FAMILY MEDICINE CLINIC | Age: 63
End: 2018-06-06

## 2018-06-06 DIAGNOSIS — E03.9 HYPOTHYROIDISM, UNSPECIFIED TYPE: ICD-10-CM

## 2018-06-06 DIAGNOSIS — I10 ESSENTIAL HYPERTENSION: ICD-10-CM

## 2018-06-06 DIAGNOSIS — E03.9 HYPOTHYROIDISM, UNSPECIFIED TYPE: Primary | ICD-10-CM

## 2018-06-06 LAB
ALBUMIN SERPL-MCNC: 3.9 G/DL (ref 3.4–5)
ALBUMIN/GLOB SERPL: 1.4 {RATIO} (ref 0.8–1.7)
ALP SERPL-CCNC: 73 U/L (ref 45–117)
ALT SERPL-CCNC: 64 U/L (ref 13–56)
ANION GAP SERPL CALC-SCNC: 12 MMOL/L (ref 3–18)
AST SERPL-CCNC: 38 U/L (ref 15–37)
BASOPHILS # BLD: 0 K/UL (ref 0–0.06)
BASOPHILS NFR BLD: 0 % (ref 0–2)
BILIRUB SERPL-MCNC: 0.7 MG/DL (ref 0.2–1)
BUN SERPL-MCNC: 12 MG/DL (ref 7–18)
BUN/CREAT SERPL: 18 (ref 12–20)
CALCIUM SERPL-MCNC: 9.3 MG/DL (ref 8.5–10.1)
CHLORIDE SERPL-SCNC: 107 MMOL/L (ref 100–108)
CHOLEST SERPL-MCNC: 228 MG/DL
CO2 SERPL-SCNC: 24 MMOL/L (ref 21–32)
CREAT SERPL-MCNC: 0.66 MG/DL (ref 0.6–1.3)
DIFFERENTIAL METHOD BLD: ABNORMAL
EOSINOPHIL # BLD: 0.1 K/UL (ref 0–0.4)
EOSINOPHIL NFR BLD: 2 % (ref 0–5)
ERYTHROCYTE [DISTWIDTH] IN BLOOD BY AUTOMATED COUNT: 13.3 % (ref 11.6–14.5)
GLOBULIN SER CALC-MCNC: 2.8 G/DL (ref 2–4)
GLUCOSE SERPL-MCNC: 102 MG/DL (ref 74–99)
HCT VFR BLD AUTO: 44 % (ref 35–45)
HDLC SERPL-MCNC: 77 MG/DL (ref 40–60)
HDLC SERPL: 3 {RATIO} (ref 0–5)
HGB BLD-MCNC: 14 G/DL (ref 12–16)
LDLC SERPL CALC-MCNC: 116.2 MG/DL (ref 0–100)
LIPID PROFILE,FLP: ABNORMAL
LYMPHOCYTES # BLD: 2.1 K/UL (ref 0.9–3.6)
LYMPHOCYTES NFR BLD: 32 % (ref 21–52)
MCH RBC QN AUTO: 32.9 PG (ref 24–34)
MCHC RBC AUTO-ENTMCNC: 31.8 G/DL (ref 31–37)
MCV RBC AUTO: 103.3 FL (ref 74–97)
MONOCYTES # BLD: 0.7 K/UL (ref 0.05–1.2)
MONOCYTES NFR BLD: 10 % (ref 3–10)
NEUTS SEG # BLD: 3.6 K/UL (ref 1.8–8)
NEUTS SEG NFR BLD: 56 % (ref 40–73)
PLATELET # BLD AUTO: 213 K/UL (ref 135–420)
PMV BLD AUTO: 11 FL (ref 9.2–11.8)
POTASSIUM SERPL-SCNC: 3.9 MMOL/L (ref 3.5–5.5)
PROT SERPL-MCNC: 6.7 G/DL (ref 6.4–8.2)
RBC # BLD AUTO: 4.26 M/UL (ref 4.2–5.3)
SODIUM SERPL-SCNC: 143 MMOL/L (ref 136–145)
TRIGL SERPL-MCNC: 174 MG/DL (ref ?–150)
TSH SERPL DL<=0.05 MIU/L-ACNC: 0.5 UIU/ML (ref 0.36–3.74)
VLDLC SERPL CALC-MCNC: 34.8 MG/DL
WBC # BLD AUTO: 6.5 K/UL (ref 4.6–13.2)

## 2018-06-06 PROCEDURE — 80061 LIPID PANEL: CPT | Performed by: FAMILY MEDICINE

## 2018-06-06 PROCEDURE — 85025 COMPLETE CBC W/AUTO DIFF WBC: CPT | Performed by: FAMILY MEDICINE

## 2018-06-06 PROCEDURE — 84443 ASSAY THYROID STIM HORMONE: CPT | Performed by: FAMILY MEDICINE

## 2018-06-06 PROCEDURE — 80053 COMPREHEN METABOLIC PANEL: CPT | Performed by: FAMILY MEDICINE

## 2018-06-06 NOTE — PROGRESS NOTES
Patient in today for fasting labs. Venipuncture done on patient's left arm. While I was drawing patient's blood I noticed that she was shaking her head, I asked if she was okay. She said that she is having a headache and feeling dizzy. She think that it could be her blood pressure. I checked the patient's blood pressure and it was at 111/77. I told her that her vitals looks fine and suggested that she should go get something eat since she has been fasting. Patient agreed and left the office.      Labs Collected:  CBC  Lipid Panel  CMP  TSH

## 2018-06-08 NOTE — PROGRESS NOTES
Please let patient know her liver enzymes remain elevated. He has been seen by the gastroenterologist in the past and was scheduled for follow-up here. He should follow-up as had been recommended by the gastroenterologist.  She has a history of a fatty liver. Her LDL cholesterol is slightly elevated at 116. It has been at this level for a long time. He does remain stable. She should intensify lifestyle modification and dietary modification. Given her elevated liver enzymes I will hold off on starting statin medications.   Steven Christina MD

## 2018-06-15 ENCOUNTER — HOSPITAL ENCOUNTER (OUTPATIENT)
Dept: MAMMOGRAPHY | Age: 63
Discharge: HOME | End: 2018-06-15
Attending: FAMILY MEDICINE
Payer: COMMERCIAL

## 2018-06-15 ENCOUNTER — HOSPITAL ENCOUNTER (OUTPATIENT)
Dept: ULTRASOUND IMAGING | Age: 63
Discharge: HOME | End: 2018-06-15
Attending: FAMILY MEDICINE
Payer: COMMERCIAL

## 2018-06-15 DIAGNOSIS — N63.0 BREAST LUMP: ICD-10-CM

## 2018-06-15 PROCEDURE — 77062 BREAST TOMOSYNTHESIS BI: CPT

## 2018-06-15 PROCEDURE — 76642 ULTRASOUND BREAST LIMITED: CPT

## 2018-09-01 DIAGNOSIS — J30.2 SEASONAL ALLERGIC RHINITIS, UNSPECIFIED TRIGGER: ICD-10-CM

## 2018-09-04 RX ORDER — MONTELUKAST SODIUM 10 MG/1
TABLET ORAL
Qty: 90 TAB | Refills: 0 | Status: SHIPPED | OUTPATIENT
Start: 2018-09-04

## 2018-10-15 RX ORDER — VENLAFAXINE HYDROCHLORIDE 75 MG/1
CAPSULE, EXTENDED RELEASE ORAL
Qty: 90 CAP | Refills: 0 | Status: SHIPPED | OUTPATIENT
Start: 2018-10-15

## 2018-10-18 DIAGNOSIS — I10 ESSENTIAL HYPERTENSION: ICD-10-CM

## 2018-10-18 RX ORDER — OLMESARTAN MEDOXOMIL 20 MG/1
20 TABLET, FILM COATED ORAL DAILY
Qty: 90 TAB | Refills: 1 | Status: SHIPPED | OUTPATIENT
Start: 2018-10-18 | End: 2019-04-28 | Stop reason: SDUPTHER

## 2018-10-18 NOTE — TELEPHONE ENCOUNTER
Requested Prescriptions     Pending Prescriptions Disp Refills    BENICAR 20 mg tablet 90 Tab 1     Sig: Take 1 Tab by mouth daily.     [phone] Please call prescription into [facility])

## 2018-10-18 NOTE — TELEPHONE ENCOUNTER
Patient called back to give info for the pharmacy. [facility] [employer] [address] [phone]. Stated she would call that pharmacy as well to make sure it's correct.  Please contact patient when available

## 2018-10-23 ENCOUNTER — TELEPHONE (OUTPATIENT)
Dept: NEUROLOGY | Age: 63
End: 2018-10-23

## 2018-10-23 NOTE — TELEPHONE ENCOUNTER
Called, spoke with patient regarding missed delivered medication. Attempted to verify address. Patient states they are out of town for another 4 weeks in [facility]; currently living in an extended stay and transitioning to [address] caller was able to give provider's name, patient's name,  and medication; delivered to her home instead of patient. She was able to retrieve this information after opening the package.     Patient made aware and reports the young lady has called her in the past.

## 2018-11-08 DIAGNOSIS — M62.838 MUSCLE SPASM: ICD-10-CM

## 2018-11-09 RX ORDER — METHOCARBAMOL 750 MG/1
TABLET, FILM COATED ORAL
Qty: 270 TAB | Refills: 1 | Status: SHIPPED | OUTPATIENT
Start: 2018-11-09

## 2018-12-04 ENCOUNTER — TELEPHONE (OUTPATIENT)
Dept: FAMILY MEDICINE CLINIC | Age: 63
End: 2018-12-04

## 2018-12-04 NOTE — TELEPHONE ENCOUNTER
Patient wanted to speak to Dr Sherry Allan, stating she is moving back to [place] and wanted to say goodbye and that she really enjoyed having him as a Dr. If possible please contact when available

## 2019-04-28 DIAGNOSIS — I10 ESSENTIAL HYPERTENSION: ICD-10-CM

## 2019-04-29 RX ORDER — OLMESARTAN MEDOXOMIL 20 MG/1
TABLET, FILM COATED ORAL
Qty: 90 TAB | Refills: 0 | Status: SHIPPED | OUTPATIENT
Start: 2019-04-29

## 2020-05-07 NOTE — TELEPHONE ENCOUNTER
Patient called requesting to speak to a nurse regarding her mammogram. She states the order needs to say: Diagnostic.  She is requesting a call back after the order has been changed    [phone]
Please find out from patient why she would want a mammogram to read diagnostic.   Does she have any breast lumps and if so in which breast.  Otherwise if this is her regular yearly mammogram it is screening mammogram.  Ingrid Brandon MD
S1-S2

## 2021-08-08 NOTE — MR AVS SNAPSHOT
Visit Information Date & Time Provider Department Dept. Phone Encounter #  
 8/15/2017  4:00 PM Lynda Woody MD [address] [phone] [id number] Follow-up Instructions Return in about 4 months (around 12/15/2017). Follow-up and Disposition History Your Appointments 12/14/2017  4:00 PM  
Follow Up with Lynda Woody MD  
[address] Appt Note: 4 mo f/u  
 [address] [id number] [phone]  
  
   
 [place] [id number] Upcoming Health Maintenance Date Due  
 PAP AKA CERVICAL CYTOLOGY 11/6/1977 ZOSTER VACCINE AGE 60> 9/6/2016 INFLUENZA AGE 9 TO ADULT 8/1/2017 FOBT Q 1 YEAR AGE 50-75 2/22/2018 BREAST CANCER SCRN MAMMOGRAM 6/28/2018 DTaP/Tdap/Td series (3 - Td) 2/21/2027 Allergies as of 8/15/2017  Review Complete On: 8/15/2017 By: Lynda Woody MD  
  
 Severity Noted Reaction Type Reactions Other Medication High 03/15/2017    Anaphylaxis ANTI INFLAMATORY Sulfa (Sulfonamide Antibiotics)  01/26/2017    Rash Current Immunizations  Reviewed on 2/21/2017 Name Date Tdap 2/21/2017 Not reviewed this visit You Were Diagnosed With   
  
 Codes Comments Migraine, transformed    -  Primary ICD-10-CM: C20.432 ICD-9-CM: 346.70 Migraine with vertigo     ICD-10-CM: G43.109 ICD-9-CM: 346.00 Other depression     ICD-10-CM: F32.89 ICD-9-CM: 659 Essential hypertension     ICD-10-CM: I10 
ICD-9-CM: 401.9 Vitals BP Pulse Temp Resp Height(growth percentile) Weight(growth percentile) 100/60 81 98.6 °F (37 °C) (Oral) 18 5' 8\" (1.727 m) 195 lb 12.8 oz (88.8 kg) SpO2 BMI OB Status Smoking Status 97% 29.77 kg/m2 Hysterectomy Never Smoker BMI and BSA Data Body Mass Index Body Surface Area  
 29.77 kg/m 2 2.06 m 2 Preferred Pharmacy Pharmacy Name Phone [facility] [phone] Your Updated Medication List  
  
   
This list is accurate as of: 8/15/17  4:31 PM.  Always use your most recent med list.  
  
  
  
  
 BENICAR 20 mg tablet Generic drug:  olmesartan Take 1 Tab by mouth daily. cyclobenzaprine 10 mg tablet Commonly known as:  FLEXERIL Take  by mouth three (3) times daily as needed for Muscle Spasm(s). diazePAM 5 mg tablet Commonly known as:  VALIUM Take 1 Tab by mouth daily as needed for Anxiety (vertigo). divalproex  mg ER tablet Commonly known as:  DEPAKOTE ER  
2 at bedtime nightly DYMISTA 137-50 mcg/spray [place] Generic drug:  azelastine-fluticasone  
by Nasal route. EVAMIST 1.53 mg/spray (1.7%) [place] Generic drug:  Estradiol  
by TransDERmal route.  
  
 gabapentin 100 mg capsule Commonly known as:  NEURONTIN  
TAKE 2 BY MOUTH THREE TIMES DAILY HYDROcodone-acetaminophen 7.5-500 mg per tablet Commonly known as:  [address] Take  by mouth every four (4) hours as needed for Pain.  
  
 levothyroxine 50 mcg tablet Commonly known as:  SYNTHROID Take  by mouth Daily (before breakfast). magnesium oxide 400 mg tablet Commonly known as:  MAG-OX Take 400 mg by mouth daily. methocarbamol 750 mg tablet Commonly known as:  ROBAXIN  
TAKE 1 BY MOUTH 3 TIMES DAILY Omega-3-DHA-EPA-Fish Oil 1,000 mg (120 mg-180 mg) Cap Take  by mouth. topiramate 25 mg tablet Commonly known as:  TOPAMAX  
2  q am 3 qhs  
  
 venlafaxine- mg capsule Commonly known as:  EFFEXOR XR  
1 cap daily VITAMIN B-1 100 mg tablet Generic drug:  thiamine Take  by mouth daily. VITAMIN B-12 1,000 mcg/mL Drop Generic drug:  cyanocobalamin (vitamin B-12) Take  by mouth. VITAMIN D3 1,000 unit Cap Generic drug:  cholecalciferol Take  by mouth daily.   
  
 WOMEN'S DAILY MULTIVITAMIN PO  
 Take  by mouth. Prescriptions Sent to Mail Order Refills  
 topiramate (TOPAMAX) 25 mg tablet 2 Si  q am 3 qhs  
 Class: Mail Order Pharmacy: [facility] - [address] Ph #: [phone] Prescriptions Sent to Pharmacy Refills  
 divalproex ER (DEPAKOTE ER) 500 mg ER tablet 2 Si at bedtime nightly Class: Normal  
 Pharmacy: [facility] - [address] Ph #: [phone] Follow-up Instructions Return in about 4 months (around 12/15/2017). Please provide this summary of care documentation to your next provider. Your primary care clinician is listed as Berta Rivera. If you have any questions after today's visit, please call [phone]. my legs are hurting.